# Patient Record
Sex: MALE | Race: WHITE | NOT HISPANIC OR LATINO | Employment: FULL TIME | RURAL
[De-identification: names, ages, dates, MRNs, and addresses within clinical notes are randomized per-mention and may not be internally consistent; named-entity substitution may affect disease eponyms.]

---

## 2018-02-19 ENCOUNTER — HISTORICAL (OUTPATIENT)
Dept: ADMINISTRATIVE | Facility: HOSPITAL | Age: 33
End: 2018-02-19

## 2018-02-21 LAB
LAB AP CLINICAL INFORMATION: NORMAL
LAB AP DIAGNOSIS - HISTORICAL: NORMAL
LAB AP GROSS PATHOLOGY - HISTORICAL: NORMAL
LAB AP SPECIMEN SUBMITTED - HISTORICAL: NORMAL

## 2021-05-17 RX ORDER — RIVAROXABAN 20 MG/1
TABLET, FILM COATED ORAL
COMMUNITY
Start: 2021-05-15 | End: 2021-07-26 | Stop reason: SDUPTHER

## 2021-05-17 RX ORDER — DILTIAZEM HYDROCHLORIDE 240 MG/1
CAPSULE, COATED, EXTENDED RELEASE ORAL
COMMUNITY
Start: 2021-03-20 | End: 2021-05-17 | Stop reason: SDUPTHER

## 2021-05-17 RX ORDER — FUROSEMIDE 40 MG/1
40 TABLET ORAL DAILY
Qty: 90 TABLET | Refills: 3 | Status: SHIPPED | OUTPATIENT
Start: 2021-05-17 | End: 2022-06-03 | Stop reason: SDUPTHER

## 2021-05-17 RX ORDER — POTASSIUM CHLORIDE 1500 MG/1
TABLET, EXTENDED RELEASE ORAL
COMMUNITY
Start: 2021-03-20 | End: 2021-05-17 | Stop reason: SDUPTHER

## 2021-05-17 RX ORDER — METOPROLOL SUCCINATE 100 MG/1
100 TABLET, EXTENDED RELEASE ORAL DAILY
Qty: 90 TABLET | Refills: 3 | Status: SHIPPED | OUTPATIENT
Start: 2021-05-17 | End: 2022-06-03 | Stop reason: SDUPTHER

## 2021-05-17 RX ORDER — FUROSEMIDE 40 MG/1
TABLET ORAL
COMMUNITY
Start: 2021-03-20 | End: 2021-05-17 | Stop reason: SDUPTHER

## 2021-05-17 RX ORDER — POTASSIUM CHLORIDE 1500 MG/1
20 TABLET, EXTENDED RELEASE ORAL DAILY
Qty: 90 TABLET | Refills: 3 | Status: SHIPPED | OUTPATIENT
Start: 2021-05-17 | End: 2021-08-15

## 2021-05-17 RX ORDER — MONTELUKAST SODIUM 10 MG/1
10 TABLET ORAL NIGHTLY
COMMUNITY
End: 2021-05-17 | Stop reason: SDUPTHER

## 2021-05-17 RX ORDER — ATORVASTATIN CALCIUM 20 MG/1
20 TABLET, FILM COATED ORAL DAILY
Qty: 90 TABLET | Refills: 3 | Status: SHIPPED | OUTPATIENT
Start: 2021-05-17 | End: 2022-06-03 | Stop reason: SDUPTHER

## 2021-05-17 RX ORDER — DILTIAZEM HYDROCHLORIDE 240 MG/1
240 CAPSULE, COATED, EXTENDED RELEASE ORAL DAILY
Qty: 90 CAPSULE | Refills: 3 | Status: SHIPPED | OUTPATIENT
Start: 2021-05-17 | End: 2022-06-03 | Stop reason: SDUPTHER

## 2021-05-17 RX ORDER — METOPROLOL SUCCINATE 100 MG/1
TABLET, EXTENDED RELEASE ORAL
COMMUNITY
Start: 2021-03-20 | End: 2021-05-17 | Stop reason: SDUPTHER

## 2021-05-17 RX ORDER — ATORVASTATIN CALCIUM 20 MG/1
TABLET, FILM COATED ORAL
COMMUNITY
Start: 2021-03-20 | End: 2021-05-17 | Stop reason: SDUPTHER

## 2021-05-17 RX ORDER — MONTELUKAST SODIUM 10 MG/1
10 TABLET ORAL DAILY
Qty: 90 TABLET | Refills: 3 | Status: SHIPPED | OUTPATIENT
Start: 2021-05-17 | End: 2021-08-15

## 2021-06-14 RX ORDER — MECLIZINE HYDROCHLORIDE 25 MG/1
25 TABLET ORAL 4 TIMES DAILY PRN
COMMUNITY
End: 2022-10-19 | Stop reason: SDUPTHER

## 2021-06-14 RX ORDER — CETIRIZINE HYDROCHLORIDE 10 MG/1
10 TABLET ORAL DAILY
COMMUNITY

## 2021-06-14 RX ORDER — ACETAMINOPHEN 500 MG
5000 TABLET ORAL DAILY
COMMUNITY

## 2021-07-26 ENCOUNTER — OFFICE VISIT (OUTPATIENT)
Dept: FAMILY MEDICINE | Facility: CLINIC | Age: 36
End: 2021-07-26
Payer: COMMERCIAL

## 2021-07-26 VITALS
SYSTOLIC BLOOD PRESSURE: 134 MMHG | OXYGEN SATURATION: 95 % | HEIGHT: 72 IN | HEART RATE: 61 BPM | DIASTOLIC BLOOD PRESSURE: 74 MMHG | TEMPERATURE: 97 F | WEIGHT: 294 LBS | BODY MASS INDEX: 39.82 KG/M2 | RESPIRATION RATE: 18 BRPM

## 2021-07-26 DIAGNOSIS — E55.9 VITAMIN D DEFICIENCY: ICD-10-CM

## 2021-07-26 DIAGNOSIS — E78.5 HYPERLIPIDEMIA, UNSPECIFIED HYPERLIPIDEMIA TYPE: ICD-10-CM

## 2021-07-26 DIAGNOSIS — Z00.00 ROUTINE GENERAL MEDICAL EXAMINATION AT A HEALTH CARE FACILITY: Primary | ICD-10-CM

## 2021-07-26 DIAGNOSIS — R73.9 HYPERGLYCEMIA: ICD-10-CM

## 2021-07-26 DIAGNOSIS — I48.11 LONGSTANDING PERSISTENT ATRIAL FIBRILLATION: ICD-10-CM

## 2021-07-26 PROCEDURE — 1160F PR REVIEW ALL MEDS BY PRESCRIBER/CLIN PHARMACIST DOCUMENTED: ICD-10-PCS | Mod: ,,, | Performed by: FAMILY MEDICINE

## 2021-07-26 PROCEDURE — 1160F RVW MEDS BY RX/DR IN RCRD: CPT | Mod: ,,, | Performed by: FAMILY MEDICINE

## 2021-07-26 PROCEDURE — 3008F BODY MASS INDEX DOCD: CPT | Mod: ,,, | Performed by: FAMILY MEDICINE

## 2021-07-26 PROCEDURE — 99395 PR PREVENTIVE VISIT,EST,18-39: ICD-10-PCS | Mod: ,,, | Performed by: FAMILY MEDICINE

## 2021-07-26 PROCEDURE — 99395 PREV VISIT EST AGE 18-39: CPT | Mod: ,,, | Performed by: FAMILY MEDICINE

## 2021-07-26 PROCEDURE — 3008F PR BODY MASS INDEX (BMI) DOCUMENTED: ICD-10-PCS | Mod: ,,, | Performed by: FAMILY MEDICINE

## 2021-07-26 PROCEDURE — 1159F PR MEDICATION LIST DOCUMENTED IN MEDICAL RECORD: ICD-10-PCS | Mod: ,,, | Performed by: FAMILY MEDICINE

## 2021-07-26 PROCEDURE — 1159F MED LIST DOCD IN RCRD: CPT | Mod: ,,, | Performed by: FAMILY MEDICINE

## 2021-07-26 RX ORDER — RIVAROXABAN 20 MG/1
20 TABLET, FILM COATED ORAL DAILY
Qty: 90 TABLET | Refills: 3 | Status: SHIPPED | OUTPATIENT
Start: 2021-07-26 | End: 2022-06-03 | Stop reason: SDUPTHER

## 2021-07-27 PROBLEM — I48.11 LONGSTANDING PERSISTENT ATRIAL FIBRILLATION: Status: ACTIVE | Noted: 2021-07-27

## 2021-08-18 ENCOUNTER — OFFICE VISIT (OUTPATIENT)
Dept: FAMILY MEDICINE | Facility: CLINIC | Age: 36
End: 2021-08-18
Payer: COMMERCIAL

## 2021-08-18 VITALS
DIASTOLIC BLOOD PRESSURE: 76 MMHG | SYSTOLIC BLOOD PRESSURE: 118 MMHG | OXYGEN SATURATION: 96 % | RESPIRATION RATE: 18 BRPM | TEMPERATURE: 99 F | HEART RATE: 97 BPM

## 2021-08-18 DIAGNOSIS — Z20.822 COUGH WITH EXPOSURE TO SEVERE ACUTE RESPIRATORY SYNDROME CORONAVIRUS 2 (SARS-COV-2): Primary | ICD-10-CM

## 2021-08-18 DIAGNOSIS — R05.8 COUGH WITH EXPOSURE TO SEVERE ACUTE RESPIRATORY SYNDROME CORONAVIRUS 2 (SARS-COV-2): Primary | ICD-10-CM

## 2021-08-18 LAB
CTP QC/QA: YES
FLUAV AG NPH QL: NEGATIVE
FLUBV AG NPH QL: NEGATIVE
SARS-COV-2 AG RESP QL IA.RAPID: NEGATIVE

## 2021-08-18 PROCEDURE — 1159F PR MEDICATION LIST DOCUMENTED IN MEDICAL RECORD: ICD-10-PCS | Mod: ,,, | Performed by: NURSE PRACTITIONER

## 2021-08-18 PROCEDURE — 87428 POCT SARS-COV2 (COVID) WITH FLU ANTIGEN: ICD-10-PCS | Mod: QW,,, | Performed by: NURSE PRACTITIONER

## 2021-08-18 PROCEDURE — 99214 PR OFFICE/OUTPT VISIT, EST, LEVL IV, 30-39 MIN: ICD-10-PCS | Mod: ,,, | Performed by: NURSE PRACTITIONER

## 2021-08-18 PROCEDURE — 3078F DIAST BP <80 MM HG: CPT | Mod: ,,, | Performed by: NURSE PRACTITIONER

## 2021-08-18 PROCEDURE — 3044F HG A1C LEVEL LT 7.0%: CPT | Mod: ,,, | Performed by: NURSE PRACTITIONER

## 2021-08-18 PROCEDURE — 1159F MED LIST DOCD IN RCRD: CPT | Mod: ,,, | Performed by: NURSE PRACTITIONER

## 2021-08-18 PROCEDURE — 3074F PR MOST RECENT SYSTOLIC BLOOD PRESSURE < 130 MM HG: ICD-10-PCS | Mod: ,,, | Performed by: NURSE PRACTITIONER

## 2021-08-18 PROCEDURE — 3044F PR MOST RECENT HEMOGLOBIN A1C LEVEL <7.0%: ICD-10-PCS | Mod: ,,, | Performed by: NURSE PRACTITIONER

## 2021-08-18 PROCEDURE — 1160F RVW MEDS BY RX/DR IN RCRD: CPT | Mod: ,,, | Performed by: NURSE PRACTITIONER

## 2021-08-18 PROCEDURE — 3074F SYST BP LT 130 MM HG: CPT | Mod: ,,, | Performed by: NURSE PRACTITIONER

## 2021-08-18 PROCEDURE — 1160F PR REVIEW ALL MEDS BY PRESCRIBER/CLIN PHARMACIST DOCUMENTED: ICD-10-PCS | Mod: ,,, | Performed by: NURSE PRACTITIONER

## 2021-08-18 PROCEDURE — 3078F PR MOST RECENT DIASTOLIC BLOOD PRESSURE < 80 MM HG: ICD-10-PCS | Mod: ,,, | Performed by: NURSE PRACTITIONER

## 2021-08-18 PROCEDURE — 87428 SARSCOV & INF VIR A&B AG IA: CPT | Mod: QW,,, | Performed by: NURSE PRACTITIONER

## 2021-08-18 PROCEDURE — 99214 OFFICE O/P EST MOD 30 MIN: CPT | Mod: ,,, | Performed by: NURSE PRACTITIONER

## 2021-08-18 RX ORDER — POTASSIUM CHLORIDE 20 MEQ/1
20 TABLET, EXTENDED RELEASE ORAL 2 TIMES DAILY
COMMUNITY
End: 2022-06-03 | Stop reason: SDUPTHER

## 2021-08-18 RX ORDER — MONTELUKAST SODIUM 10 MG/1
10 TABLET ORAL NIGHTLY
COMMUNITY
End: 2022-06-03 | Stop reason: SDUPTHER

## 2021-08-20 ENCOUNTER — CLINICAL SUPPORT (OUTPATIENT)
Dept: FAMILY MEDICINE | Facility: CLINIC | Age: 36
End: 2021-08-20
Payer: COMMERCIAL

## 2021-08-20 DIAGNOSIS — Z11.52 ENCOUNTER FOR SCREENING LABORATORY TESTING FOR COVID-19 VIRUS: Primary | ICD-10-CM

## 2021-08-23 PROCEDURE — U0005 INFEC AGEN DETEC AMPLI PROBE: HCPCS | Mod: ,,, | Performed by: CLINICAL MEDICAL LABORATORY

## 2021-08-23 PROCEDURE — U0003 INFECTIOUS AGENT DETECTION BY NUCLEIC ACID (DNA OR RNA); SEVERE ACUTE RESPIRATORY SYNDROME CORONAVIRUS 2 (SARS-COV-2) (CORONAVIRUS DISEASE [COVID-19]), AMPLIFIED PROBE TECHNIQUE, MAKING USE OF HIGH THROUGHPUT TECHNOLOGIES AS DESCRIBED BY CMS-2020-01-R: HCPCS | Mod: 90,,, | Performed by: CLINICAL MEDICAL LABORATORY

## 2021-08-23 PROCEDURE — U0003 MAYO GENERIC ORDERABLE: ICD-10-PCS | Mod: 90,,, | Performed by: CLINICAL MEDICAL LABORATORY

## 2021-08-23 PROCEDURE — U0005 PR INFECT AGENT DETECTION, SARS-COV-2 COVID-19, AMPLIF PROBE: ICD-10-PCS | Mod: ,,, | Performed by: CLINICAL MEDICAL LABORATORY

## 2021-08-24 ENCOUNTER — CLINICAL SUPPORT (OUTPATIENT)
Dept: FAMILY MEDICINE | Facility: CLINIC | Age: 36
End: 2021-08-24
Payer: COMMERCIAL

## 2021-08-24 DIAGNOSIS — Z20.822 COUGH WITH EXPOSURE TO SEVERE ACUTE RESPIRATORY SYNDROME CORONAVIRUS 2 (SARS-COV-2): Primary | ICD-10-CM

## 2021-08-24 DIAGNOSIS — J32.9 SINUSITIS, UNSPECIFIED CHRONICITY, UNSPECIFIED LOCATION: ICD-10-CM

## 2021-08-24 DIAGNOSIS — R05.8 COUGH WITH EXPOSURE TO SEVERE ACUTE RESPIRATORY SYNDROME CORONAVIRUS 2 (SARS-COV-2): Primary | ICD-10-CM

## 2021-08-24 PROCEDURE — 87428 SARSCOV & INF VIR A&B AG IA: CPT | Mod: QW,,, | Performed by: NURSE PRACTITIONER

## 2021-08-24 PROCEDURE — 87428 POCT SARS-COV2 (COVID) WITH FLU ANTIGEN: ICD-10-PCS | Mod: QW,,, | Performed by: NURSE PRACTITIONER

## 2021-08-24 RX ORDER — METHYLPREDNISOLONE 4 MG/1
TABLET ORAL
Qty: 1 PACKAGE | Refills: 0 | Status: SHIPPED | OUTPATIENT
Start: 2021-08-24 | End: 2022-01-26 | Stop reason: ALTCHOICE

## 2021-08-24 RX ORDER — AZITHROMYCIN 250 MG/1
TABLET, FILM COATED ORAL
Qty: 5 TABLET | Refills: 0 | Status: SHIPPED | OUTPATIENT
Start: 2021-08-24 | End: 2022-01-26 | Stop reason: ALTCHOICE

## 2021-08-24 RX ORDER — METHYLPREDNISOLONE 4 MG/1
4 TABLET ORAL
COMMUNITY
End: 2021-08-24 | Stop reason: SDUPTHER

## 2021-08-24 RX ORDER — AZITHROMYCIN 250 MG/1
500 TABLET, FILM COATED ORAL DAILY
COMMUNITY
End: 2021-08-24 | Stop reason: SDUPTHER

## 2021-08-25 LAB — MAYO GENERIC ORDERABLE RESULT: NORMAL

## 2021-10-01 ENCOUNTER — CLINICAL SUPPORT (OUTPATIENT)
Dept: FAMILY MEDICINE | Facility: CLINIC | Age: 36
End: 2021-10-01
Payer: COMMERCIAL

## 2021-10-01 DIAGNOSIS — Z23 NEED FOR IMMUNIZATION AGAINST INFLUENZA: Primary | ICD-10-CM

## 2021-10-01 PROCEDURE — 90471 IMMUNIZATION ADMIN: CPT | Mod: ,,, | Performed by: NURSE PRACTITIONER

## 2021-10-01 PROCEDURE — 90686 FLU VACCINE (QUAD) GREATER THAN OR EQUAL TO 3YO PRESERVATIVE FREE IM: ICD-10-PCS | Mod: ,,, | Performed by: NURSE PRACTITIONER

## 2021-10-01 PROCEDURE — 90471 FLU VACCINE (QUAD) GREATER THAN OR EQUAL TO 3YO PRESERVATIVE FREE IM: ICD-10-PCS | Mod: ,,, | Performed by: NURSE PRACTITIONER

## 2021-10-01 PROCEDURE — 90686 IIV4 VACC NO PRSV 0.5 ML IM: CPT | Mod: ,,, | Performed by: NURSE PRACTITIONER

## 2022-01-17 ENCOUNTER — LAB VISIT (OUTPATIENT)
Dept: LAB | Facility: CLINIC | Age: 37
End: 2022-01-17
Payer: COMMERCIAL

## 2022-01-17 DIAGNOSIS — E78.5 HYPERLIPIDEMIA, UNSPECIFIED HYPERLIPIDEMIA TYPE: ICD-10-CM

## 2022-01-17 DIAGNOSIS — R73.9 HYPERGLYCEMIA: ICD-10-CM

## 2022-01-17 DIAGNOSIS — E55.9 VITAMIN D DEFICIENCY: ICD-10-CM

## 2022-01-17 DIAGNOSIS — Z13.220 SCREENING FOR LIPOID DISORDERS: ICD-10-CM

## 2022-01-17 DIAGNOSIS — I48.11 LONGSTANDING PERSISTENT ATRIAL FIBRILLATION: ICD-10-CM

## 2022-01-17 DIAGNOSIS — Z13.1 SCREENING FOR DIABETES MELLITUS: Primary | ICD-10-CM

## 2022-01-17 LAB
25(OH)D3 SERPL-MCNC: 35.8 NG/ML
ALBUMIN SERPL BCP-MCNC: 3.8 G/DL (ref 3.5–5)
ALBUMIN/GLOB SERPL: 0.9 {RATIO}
ALP SERPL-CCNC: 90 U/L (ref 45–115)
ALT SERPL W P-5'-P-CCNC: 33 U/L (ref 16–61)
ANION GAP SERPL CALCULATED.3IONS-SCNC: 7 MMOL/L (ref 7–16)
AST SERPL W P-5'-P-CCNC: 22 U/L (ref 15–37)
BASOPHILS # BLD AUTO: 0.06 K/UL (ref 0–0.2)
BASOPHILS NFR BLD AUTO: 0.8 % (ref 0–1)
BILIRUB SERPL-MCNC: 2 MG/DL (ref 0–1.2)
BUN SERPL-MCNC: 7 MG/DL (ref 7–18)
BUN/CREAT SERPL: 9 (ref 6–20)
CALCIUM SERPL-MCNC: 8.9 MG/DL (ref 8.5–10.1)
CHLORIDE SERPL-SCNC: 104 MMOL/L (ref 98–107)
CHOLEST SERPL-MCNC: 121 MG/DL (ref 0–200)
CHOLEST/HDLC SERPL: 3.8 {RATIO}
CO2 SERPL-SCNC: 32 MMOL/L (ref 21–32)
CREAT SERPL-MCNC: 0.76 MG/DL (ref 0.7–1.3)
DIFFERENTIAL METHOD BLD: ABNORMAL
EOSINOPHIL # BLD AUTO: 0.2 K/UL (ref 0–0.5)
EOSINOPHIL NFR BLD AUTO: 2.7 % (ref 1–4)
ERYTHROCYTE [DISTWIDTH] IN BLOOD BY AUTOMATED COUNT: 13.2 % (ref 11.5–14.5)
EST. AVERAGE GLUCOSE BLD GHB EST-MCNC: 117 MG/DL
GLOBULIN SER-MCNC: 4.1 G/DL (ref 2–4)
GLUCOSE SERPL-MCNC: 109 MG/DL (ref 74–106)
HBA1C MFR BLD HPLC: 6.1 % (ref 4.5–6.6)
HCT VFR BLD AUTO: 49.6 % (ref 40–54)
HDLC SERPL-MCNC: 32 MG/DL (ref 40–60)
HGB BLD-MCNC: 15.8 G/DL (ref 13.5–18)
IMM GRANULOCYTES # BLD AUTO: 0.02 K/UL (ref 0–0.04)
IMM GRANULOCYTES NFR BLD: 0.3 % (ref 0–0.4)
LDLC SERPL CALC-MCNC: 77 MG/DL
LDLC/HDLC SERPL: 2.4 {RATIO}
LYMPHOCYTES # BLD AUTO: 1.73 K/UL (ref 1–4.8)
LYMPHOCYTES NFR BLD AUTO: 22.9 % (ref 27–41)
MCH RBC QN AUTO: 27.5 PG (ref 27–31)
MCHC RBC AUTO-ENTMCNC: 31.9 G/DL (ref 32–36)
MCV RBC AUTO: 86.3 FL (ref 80–96)
MONOCYTES # BLD AUTO: 0.71 K/UL (ref 0–0.8)
MONOCYTES NFR BLD AUTO: 9.4 % (ref 2–6)
MPC BLD CALC-MCNC: 11.2 FL (ref 9.4–12.4)
NEUTROPHILS # BLD AUTO: 4.82 K/UL (ref 1.8–7.7)
NEUTROPHILS NFR BLD AUTO: 63.9 % (ref 53–65)
NONHDLC SERPL-MCNC: 89 MG/DL
NRBC # BLD AUTO: 0 X10E3/UL
NRBC, AUTO (.00): 0 %
PLATELET # BLD AUTO: 317 K/UL (ref 150–400)
POTASSIUM SERPL-SCNC: 3.9 MMOL/L (ref 3.5–5.1)
PROT SERPL-MCNC: 7.9 G/DL (ref 6.4–8.2)
RBC # BLD AUTO: 5.75 M/UL (ref 4.6–6.2)
SODIUM SERPL-SCNC: 139 MMOL/L (ref 136–145)
TRIGL SERPL-MCNC: 59 MG/DL (ref 35–150)
TSH SERPL DL<=0.005 MIU/L-ACNC: 3.09 UIU/ML (ref 0.36–3.74)
VLDLC SERPL-MCNC: 12 MG/DL
WBC # BLD AUTO: 7.54 K/UL (ref 4.5–11)

## 2022-01-17 PROCEDURE — 83036 HEMOGLOBIN A1C: ICD-10-PCS | Mod: ICN,,, | Performed by: CLINICAL MEDICAL LABORATORY

## 2022-01-17 PROCEDURE — 82306 VITAMIN D: ICD-10-PCS | Mod: ICN,,, | Performed by: CLINICAL MEDICAL LABORATORY

## 2022-01-17 PROCEDURE — 83036 HEMOGLOBIN GLYCOSYLATED A1C: CPT | Mod: ICN,,, | Performed by: CLINICAL MEDICAL LABORATORY

## 2022-01-17 PROCEDURE — 80061 LIPID PANEL: ICD-10-PCS | Mod: ICN,,, | Performed by: CLINICAL MEDICAL LABORATORY

## 2022-01-17 PROCEDURE — 82306 VITAMIN D 25 HYDROXY: CPT | Mod: ICN,,, | Performed by: CLINICAL MEDICAL LABORATORY

## 2022-01-17 PROCEDURE — 80050 CBC WITH DIFFERENTIAL: ICD-10-PCS | Mod: ICN,,, | Performed by: CLINICAL MEDICAL LABORATORY

## 2022-01-17 PROCEDURE — 80061 LIPID PANEL: CPT | Mod: ICN,,, | Performed by: CLINICAL MEDICAL LABORATORY

## 2022-01-17 PROCEDURE — 80050 GENERAL HEALTH PANEL: CPT | Mod: ICN,,, | Performed by: CLINICAL MEDICAL LABORATORY

## 2022-01-26 ENCOUNTER — OFFICE VISIT (OUTPATIENT)
Dept: FAMILY MEDICINE | Facility: CLINIC | Age: 37
End: 2022-01-26
Payer: COMMERCIAL

## 2022-01-26 VITALS
RESPIRATION RATE: 18 BRPM | TEMPERATURE: 97 F | BODY MASS INDEX: 40.23 KG/M2 | HEIGHT: 72 IN | SYSTOLIC BLOOD PRESSURE: 118 MMHG | OXYGEN SATURATION: 98 % | DIASTOLIC BLOOD PRESSURE: 65 MMHG | HEART RATE: 85 BPM | WEIGHT: 297 LBS

## 2022-01-26 DIAGNOSIS — R73.03 PRE-DIABETES: Chronic | ICD-10-CM

## 2022-01-26 DIAGNOSIS — E55.9 VITAMIN D DEFICIENCY: ICD-10-CM

## 2022-01-26 DIAGNOSIS — I48.11 LONGSTANDING PERSISTENT ATRIAL FIBRILLATION: ICD-10-CM

## 2022-01-26 DIAGNOSIS — Z00.00 ROUTINE GENERAL MEDICAL EXAMINATION AT A HEALTH CARE FACILITY: Primary | ICD-10-CM

## 2022-01-26 DIAGNOSIS — E78.5 HYPERLIPIDEMIA, UNSPECIFIED HYPERLIPIDEMIA TYPE: ICD-10-CM

## 2022-01-26 PROCEDURE — 3074F PR MOST RECENT SYSTOLIC BLOOD PRESSURE < 130 MM HG: ICD-10-PCS | Mod: ,,, | Performed by: FAMILY MEDICINE

## 2022-01-26 PROCEDURE — 1160F RVW MEDS BY RX/DR IN RCRD: CPT | Mod: ,,, | Performed by: FAMILY MEDICINE

## 2022-01-26 PROCEDURE — 3078F PR MOST RECENT DIASTOLIC BLOOD PRESSURE < 80 MM HG: ICD-10-PCS | Mod: ,,, | Performed by: FAMILY MEDICINE

## 2022-01-26 PROCEDURE — 3044F HG A1C LEVEL LT 7.0%: CPT | Mod: ,,, | Performed by: FAMILY MEDICINE

## 2022-01-26 PROCEDURE — 1159F PR MEDICATION LIST DOCUMENTED IN MEDICAL RECORD: ICD-10-PCS | Mod: ,,, | Performed by: FAMILY MEDICINE

## 2022-01-26 PROCEDURE — 3008F BODY MASS INDEX DOCD: CPT | Mod: ,,, | Performed by: FAMILY MEDICINE

## 2022-01-26 PROCEDURE — 3008F PR BODY MASS INDEX (BMI) DOCUMENTED: ICD-10-PCS | Mod: ,,, | Performed by: FAMILY MEDICINE

## 2022-01-26 PROCEDURE — 99395 PREV VISIT EST AGE 18-39: CPT | Mod: ,,, | Performed by: FAMILY MEDICINE

## 2022-01-26 PROCEDURE — 3074F SYST BP LT 130 MM HG: CPT | Mod: ,,, | Performed by: FAMILY MEDICINE

## 2022-01-26 PROCEDURE — 1159F MED LIST DOCD IN RCRD: CPT | Mod: ,,, | Performed by: FAMILY MEDICINE

## 2022-01-26 PROCEDURE — 1160F PR REVIEW ALL MEDS BY PRESCRIBER/CLIN PHARMACIST DOCUMENTED: ICD-10-PCS | Mod: ,,, | Performed by: FAMILY MEDICINE

## 2022-01-26 PROCEDURE — 3044F PR MOST RECENT HEMOGLOBIN A1C LEVEL <7.0%: ICD-10-PCS | Mod: ,,, | Performed by: FAMILY MEDICINE

## 2022-01-26 PROCEDURE — 99395 PR PREVENTIVE VISIT,EST,18-39: ICD-10-PCS | Mod: ,,, | Performed by: FAMILY MEDICINE

## 2022-01-26 PROCEDURE — 3078F DIAST BP <80 MM HG: CPT | Mod: ,,, | Performed by: FAMILY MEDICINE

## 2022-01-26 NOTE — PROGRESS NOTES
Subjective:       Patient ID: Eliecer Collins is a 37 y.o. male.    Chief Complaint: wellness (Did labs on 1/17/22)    36 yo WM here for check up / HY visit and lab done on 1/17.     Review of Systems   Constitutional: Negative for activity change.   Eyes: Negative for visual disturbance.   Respiratory: Negative for shortness of breath.    Cardiovascular: Negative for chest pain.   Gastrointestinal: Negative for abdominal pain.   Neurological: Negative for headaches.   Psychiatric/Behavioral: Negative for dysphoric mood.         Lab Visit on 01/17/2022   Component Date Value Ref Range Status    Hemoglobin A1C 01/17/2022 6.1  4.5 - 6.6 % Final      Normal:               <5.7%  Pre-Diabetic:       5.7% to 6.4%  Diabetic:             >6.4%  Diabetic Goal:     <7%    Estimated Average Glucose 01/17/2022 117  mg/dL Final    Triglycerides 01/17/2022 59  35 - 150 mg/dL Final      Normal:  <150 mg/dL  Borderline High: 150-199 mg/dL  High:   200-499 mg/dL  Very High:  >=500    Cholesterol 01/17/2022 121  0 - 200 mg/dL Final      <200 mg/dL:  Desirable  200-240 mg/dL: Borderline High  >240 mg/dL:  High    HDL Cholesterol 01/17/2022 32* 40 - 60 mg/dL Final      <40 mg/dL: Low HDL  40-60 mg/dL: Normal  >60 mg/dL: Desirable    Cholesterol/HDL Ratio (Risk Factor) 01/17/2022 3.8   Final    Non-HDL 01/17/2022 89  mg/dL Final    LDL Calculated 01/17/2022 77  mg/dL Final    Unable to calculate due to one of the following values:  Cholesterol <5  HDL Cholesterol <5  Triglycerides <10 or >400    LDL/HDL 01/17/2022 2.4   Final    Unable to calculate due to one of the following values:  Cholesterol <5  HDL Cholesterol <5  Triglycerides <10 or >400    VLDL 01/17/2022 12  mg/dL Final    Sodium 01/17/2022 139  136 - 145 mmol/L Final    Potassium 01/17/2022 3.9  3.5 - 5.1 mmol/L Final    Chloride 01/17/2022 104  98 - 107 mmol/L Final    CO2 01/17/2022 32  21 - 32 mmol/L Final    Anion Gap 01/17/2022 7  7 - 16 mmol/L Final     Glucose 01/17/2022 109* 74 - 106 mg/dL Final    BUN 01/17/2022 7  7 - 18 mg/dL Final    Creatinine 01/17/2022 0.76  0.70 - 1.30 mg/dL Final    BUN/Creatinine Ratio 01/17/2022 9  6 - 20 Final    Calcium 01/17/2022 8.9  8.5 - 10.1 mg/dL Final    Total Protein 01/17/2022 7.9  6.4 - 8.2 g/dL Final    Albumin 01/17/2022 3.8  3.5 - 5.0 g/dL Final    Globulin 01/17/2022 4.1* 2.0 - 4.0 g/dL Final    A/G Ratio 01/17/2022 0.9   Final    Bilirubin, Total 01/17/2022 2.0* 0.0 - 1.2 mg/dL Final    Alk Phos 01/17/2022 90  45 - 115 U/L Final    ALT 01/17/2022 33  16 - 61 U/L Final    AST 01/17/2022 22  15 - 37 U/L Final    eGFR 01/17/2022 123  >=60 mL/min/1.73m² Final    TSH 01/17/2022 3.090  0.358 - 3.740 uIU/mL Final    Vitamin D 25-Hydroxy, Blood 01/17/2022 35.8  ng/mL Final    Vitamin D 25-OH Adult Reference Values:  Deficiency: <20 ng/mL  Insufficiency: 20 - <30 ng/mL  Sufficiency: 30 -100 ng/mL    Vitamin D 25-OH Pediatric Reference Values:  Deficiency: <15 ng/mL  Insufficiency: 15 - <20 ng/mL  Sufficiency: 20 - 100 ng/mL    WBC 01/17/2022 7.54  4.50 - 11.00 K/uL Final    RBC 01/17/2022 5.75  4.60 - 6.20 M/uL Final    Hemoglobin 01/17/2022 15.8  13.5 - 18.0 g/dL Final    Hematocrit 01/17/2022 49.6  40.0 - 54.0 % Final    MCV 01/17/2022 86.3  80.0 - 96.0 fL Final    MCH 01/17/2022 27.5  27.0 - 31.0 pg Final    MCHC 01/17/2022 31.9* 32.0 - 36.0 g/dL Final    RDW 01/17/2022 13.2  11.5 - 14.5 % Final    Platelet Count 01/17/2022 317  150 - 400 K/uL Final    MPV 01/17/2022 11.2  9.4 - 12.4 fL Final    Neutrophils % 01/17/2022 63.9  53.0 - 65.0 % Final    Lymphocytes % 01/17/2022 22.9* 27.0 - 41.0 % Final    Monocytes % 01/17/2022 9.4* 2.0 - 6.0 % Final    Eosinophils % 01/17/2022 2.7  1.0 - 4.0 % Final    Basophils % 01/17/2022 0.8  0.0 - 1.0 % Final    Immature Granulocytes % 01/17/2022 0.3  0.0 - 0.4 % Final    nRBC, Auto 01/17/2022 0.0  <=0.0 % Final    Neutrophils, Abs 01/17/2022 4.82  1.80 -  7.70 K/uL Final    Lymphocytes, Absolute 01/17/2022 1.73  1.00 - 4.80 K/uL Final    Monocytes, Absolute 01/17/2022 0.71  0.00 - 0.80 K/uL Final    Eosinophils, Absolute 01/17/2022 0.20  0.00 - 0.50 K/uL Final    Basophils, Absolute 01/17/2022 0.06  0.00 - 0.20 K/uL Final    Immature Granulocytes, Absolute 01/17/2022 0.02  0.00 - 0.04 K/uL Final    nRBC, Absolute 01/17/2022 0.00  <=0.00 x10e3/uL Final    Diff Type 01/17/2022 Auto   Final       Objective:     Blood pressure 118/65, pulse 85, temperature 97.1 °F (36.2 °C), temperature source Skin, resp. rate 18, height 6' (1.829 m), weight 134.7 kg (297 lb), SpO2 98 %.      Physical Exam  Constitutional:       Appearance: Normal appearance.   HENT:      Head: Normocephalic and atraumatic.      Nose: Nose normal.      Mouth/Throat:      Mouth: Mucous membranes are moist.   Eyes:      Pupils: Pupils are equal, round, and reactive to light.   Cardiovascular:      Rate and Rhythm: Normal rate. Rhythm irregular.      Pulses: Normal pulses.   Pulmonary:      Effort: Pulmonary effort is normal.      Breath sounds: Normal breath sounds.   Abdominal:      General: Abdomen is flat.   Skin:     General: Skin is warm and dry.   Neurological:      General: No focal deficit present.      Mental Status: He is alert and oriented to person, place, and time.   Psychiatric:         Mood and Affect: Mood normal.         Thought Content: Thought content normal.         Judgment: Judgment normal.         Assessment:       Problem List Items Addressed This Visit        Cardiac/Vascular    Longstanding persistent atrial fibrillation    Relevant Orders    Lipid Panel    Comprehensive Metabolic Panel    Magnesium       Endocrine    Pre-diabetes (Chronic)    Relevant Orders    Hemoglobin A1C      Other Visit Diagnoses     Routine general medical examination at a health care facility    -  Primary    Hyperlipidemia, unspecified hyperlipidemia type        Relevant Orders    Hemoglobin A1C     Lipid Panel    Comprehensive Metabolic Panel    Vitamin D deficiency        Relevant Orders    Vitamin D          Plan:       RTC 6 months for second HY of the year. Lab prior, but can't use wellness codes again on the lab. Overall health goal: Improve healthy choices and lose weight to normal BMI.     Health goals: Take medications as prescribed, eat well balance meals and avoid all tobacco products.     Everyone should plan to exercise / have physical activity increased for at least 30 minutes 5 days a week.

## 2022-06-03 DIAGNOSIS — J30.89 NON-SEASONAL ALLERGIC RHINITIS, UNSPECIFIED TRIGGER: ICD-10-CM

## 2022-06-03 DIAGNOSIS — I48.11 LONGSTANDING PERSISTENT ATRIAL FIBRILLATION: ICD-10-CM

## 2022-06-03 DIAGNOSIS — E78.5 HYPERLIPIDEMIA, UNSPECIFIED HYPERLIPIDEMIA TYPE: Primary | ICD-10-CM

## 2022-06-03 DIAGNOSIS — E87.6 HYPOKALEMIA: ICD-10-CM

## 2022-06-03 RX ORDER — MONTELUKAST SODIUM 10 MG/1
10 TABLET ORAL NIGHTLY
Qty: 90 TABLET | Refills: 3 | Status: SHIPPED | OUTPATIENT
Start: 2022-06-03 | End: 2023-05-25 | Stop reason: SDUPTHER

## 2022-06-03 RX ORDER — METOPROLOL SUCCINATE 100 MG/1
100 TABLET, EXTENDED RELEASE ORAL DAILY
Qty: 90 TABLET | Refills: 3 | Status: SHIPPED | OUTPATIENT
Start: 2022-06-03 | End: 2023-05-25 | Stop reason: SDUPTHER

## 2022-06-03 RX ORDER — ATORVASTATIN CALCIUM 20 MG/1
20 TABLET, FILM COATED ORAL DAILY
Qty: 90 TABLET | Refills: 3 | Status: SHIPPED | OUTPATIENT
Start: 2022-06-03 | End: 2023-05-25 | Stop reason: SDUPTHER

## 2022-06-03 RX ORDER — FUROSEMIDE 40 MG/1
40 TABLET ORAL DAILY
Qty: 90 TABLET | Refills: 3 | Status: SHIPPED | OUTPATIENT
Start: 2022-06-03 | End: 2023-05-25 | Stop reason: SDUPTHER

## 2022-06-03 RX ORDER — DILTIAZEM HYDROCHLORIDE 240 MG/1
240 CAPSULE, COATED, EXTENDED RELEASE ORAL DAILY
Qty: 90 CAPSULE | Refills: 3 | Status: SHIPPED | OUTPATIENT
Start: 2022-06-03 | End: 2023-05-25 | Stop reason: SDUPTHER

## 2022-06-03 RX ORDER — RIVAROXABAN 20 MG/1
20 TABLET, FILM COATED ORAL DAILY
Qty: 90 TABLET | Refills: 3 | Status: SHIPPED | OUTPATIENT
Start: 2022-06-03 | End: 2023-05-25 | Stop reason: SDUPTHER

## 2022-06-03 RX ORDER — POTASSIUM CHLORIDE 20 MEQ/1
20 TABLET, EXTENDED RELEASE ORAL DAILY
Qty: 90 TABLET | Refills: 3 | Status: SHIPPED | OUTPATIENT
Start: 2022-06-03 | End: 2023-05-25 | Stop reason: SDUPTHER

## 2022-07-25 ENCOUNTER — OFFICE VISIT (OUTPATIENT)
Dept: FAMILY MEDICINE | Facility: CLINIC | Age: 37
End: 2022-07-25
Payer: COMMERCIAL

## 2022-07-25 VITALS
OXYGEN SATURATION: 95 % | BODY MASS INDEX: 39.96 KG/M2 | SYSTOLIC BLOOD PRESSURE: 110 MMHG | HEIGHT: 72 IN | HEART RATE: 83 BPM | DIASTOLIC BLOOD PRESSURE: 70 MMHG | WEIGHT: 295 LBS | TEMPERATURE: 98 F | RESPIRATION RATE: 18 BRPM

## 2022-07-25 DIAGNOSIS — Z00.00 ROUTINE GENERAL MEDICAL EXAMINATION AT A HEALTH CARE FACILITY: Primary | ICD-10-CM

## 2022-07-25 DIAGNOSIS — E78.5 HYPERLIPIDEMIA, UNSPECIFIED HYPERLIPIDEMIA TYPE: ICD-10-CM

## 2022-07-25 DIAGNOSIS — Z13.1 SCREENING FOR DIABETES MELLITUS: ICD-10-CM

## 2022-07-25 DIAGNOSIS — Z13.220 SCREENING, LIPID: ICD-10-CM

## 2022-07-25 DIAGNOSIS — E55.9 VITAMIN D DEFICIENCY: ICD-10-CM

## 2022-07-25 DIAGNOSIS — R73.03 PRE-DIABETES: ICD-10-CM

## 2022-07-25 PROCEDURE — 1159F PR MEDICATION LIST DOCUMENTED IN MEDICAL RECORD: ICD-10-PCS | Mod: ,,, | Performed by: FAMILY MEDICINE

## 2022-07-25 PROCEDURE — 1160F RVW MEDS BY RX/DR IN RCRD: CPT | Mod: ,,, | Performed by: FAMILY MEDICINE

## 2022-07-25 PROCEDURE — 3074F PR MOST RECENT SYSTOLIC BLOOD PRESSURE < 130 MM HG: ICD-10-PCS | Mod: ,,, | Performed by: FAMILY MEDICINE

## 2022-07-25 PROCEDURE — 3078F PR MOST RECENT DIASTOLIC BLOOD PRESSURE < 80 MM HG: ICD-10-PCS | Mod: ,,, | Performed by: FAMILY MEDICINE

## 2022-07-25 PROCEDURE — 3008F PR BODY MASS INDEX (BMI) DOCUMENTED: ICD-10-PCS | Mod: ,,, | Performed by: FAMILY MEDICINE

## 2022-07-25 PROCEDURE — 3044F HG A1C LEVEL LT 7.0%: CPT | Mod: ,,, | Performed by: FAMILY MEDICINE

## 2022-07-25 PROCEDURE — 3074F SYST BP LT 130 MM HG: CPT | Mod: ,,, | Performed by: FAMILY MEDICINE

## 2022-07-25 PROCEDURE — 3044F PR MOST RECENT HEMOGLOBIN A1C LEVEL <7.0%: ICD-10-PCS | Mod: ,,, | Performed by: FAMILY MEDICINE

## 2022-07-25 PROCEDURE — 99395 PREV VISIT EST AGE 18-39: CPT | Mod: ,,, | Performed by: FAMILY MEDICINE

## 2022-07-25 PROCEDURE — 99395 PR PREVENTIVE VISIT,EST,18-39: ICD-10-PCS | Mod: ,,, | Performed by: FAMILY MEDICINE

## 2022-07-25 PROCEDURE — 3008F BODY MASS INDEX DOCD: CPT | Mod: ,,, | Performed by: FAMILY MEDICINE

## 2022-07-25 PROCEDURE — 3078F DIAST BP <80 MM HG: CPT | Mod: ,,, | Performed by: FAMILY MEDICINE

## 2022-07-25 PROCEDURE — 1159F MED LIST DOCD IN RCRD: CPT | Mod: ,,, | Performed by: FAMILY MEDICINE

## 2022-07-25 PROCEDURE — 1160F PR REVIEW ALL MEDS BY PRESCRIBER/CLIN PHARMACIST DOCUMENTED: ICD-10-PCS | Mod: ,,, | Performed by: FAMILY MEDICINE

## 2022-07-25 NOTE — PROGRESS NOTES
Subjective:       Patient ID: Eliecer Collins is a 37 y.o. male.    Chief Complaint: Healthy You Vist    36 yo WM here for HY visit and lab. Doing well. Has been to [facility] recently for check up.     Review of Systems   Constitutional: Negative for activity change.   Eyes: Negative for visual disturbance.   Respiratory: Negative for shortness of breath.    Cardiovascular: Negative for chest pain.   Gastrointestinal: Negative for abdominal pain.   Neurological: Negative for headaches.   Psychiatric/Behavioral: Negative for dysphoric mood.         No visits with results within 1 Week(s) from this visit.   Latest known visit with results is:   Lab Visit on 07/08/2022   Component Date Value Ref Range Status    Hemoglobin A1C 07/08/2022 6.1  4.5 - 6.6 % Final      Normal:               <5.7%  Pre-Diabetic:       5.7% to 6.4%  Diabetic:             >6.4%  Diabetic Goal:     <7%    Estimated Average Glucose 07/08/2022 117  mg/dL Final    Triglycerides 07/08/2022 77  35 - 150 mg/dL Final      Normal:  <150 mg/dL  Borderline High: 150-199 mg/dL  High:   200-499 mg/dL  Very High:  >=500    Cholesterol 07/08/2022 135  0 - 200 mg/dL Final      <200 mg/dL:  Desirable  200-240 mg/dL: Borderline High  >240 mg/dL:  High    HDL Cholesterol 07/08/2022 33 (A) 40 - 60 mg/dL Final      <40 mg/dL: Low HDL  40-60 mg/dL: Normal  >60 mg/dL: Desirable    Cholesterol/HDL Ratio (Risk Factor) 07/08/2022 4.1   Final    Non-HDL 07/08/2022 102  mg/dL Final    LDL Calculated 07/08/2022 87  mg/dL Final    Unable to calculate due to one of the following values:  Cholesterol <5  HDL Cholesterol <5  Triglycerides <10 or >400    LDL/HDL 07/08/2022 2.6   Final    Unable to calculate due to one of the following values:  Cholesterol <5  HDL Cholesterol <5  Triglycerides <10 or >400    VLDL 07/08/2022 15  mg/dL Final    Sodium 07/08/2022 137  136 - 145 mmol/L Final    Potassium 07/08/2022 4.0  3.5 - 5.1 mmol/L Final    Chloride 07/08/2022 100  98 - 107  mmol/L Final    CO2 07/08/2022 28  21 - 32 mmol/L Final    Anion Gap 07/08/2022 13  7 - 16 mmol/L Final    Glucose 07/08/2022 103  74 - 106 mg/dL Final    BUN 07/08/2022 12  7 - 18 mg/dL Final    Creatinine 07/08/2022 0.76  0.70 - 1.30 mg/dL Final    BUN/Creatinine Ratio 07/08/2022 16  6 - 20 Final    Calcium 07/08/2022 9.4  8.5 - 10.1 mg/dL Final    Total Protein 07/08/2022 8.0  6.4 - 8.2 g/dL Final    Albumin 07/08/2022 4.3  3.5 - 5.0 g/dL Final    Globulin 07/08/2022 3.7  2.0 - 4.0 g/dL Final    A/G Ratio 07/08/2022 1.2   Final    Bilirubin, Total 07/08/2022 2.5 (A) 0.0 - 1.2 mg/dL Final    Alk Phos 07/08/2022 102  45 - 115 U/L Final    ALT 07/08/2022 41  16 - 61 U/L Final    AST 07/08/2022 25  15 - 37 U/L Final    eGFR 07/08/2022 122  >=60 mL/min/1.73m² Final    Vitamin D 25-Hydroxy, Blood 07/08/2022 58.0  ng/mL Final    Vitamin D 25-OH Adult Reference Values:  Deficiency: <20 ng/mL  Insufficiency: 20 - <30 ng/mL  Sufficiency: 30 -100 ng/mL    Vitamin D 25-OH Pediatric Reference Values:  Deficiency: <15 ng/mL  Insufficiency: 15 - <20 ng/mL  Sufficiency: 20 - 100 ng/mL    Magnesium 07/08/2022 2.1  1.7 - 2.3 mg/dL Final       Objective:     Blood pressure 110/70, pulse 83, temperature 98.4 °F (36.9 °C), temperature source Oral, resp. rate 18, height 6' (1.829 m), weight 133.8 kg (295 lb), SpO2 95 %.      Physical Exam  Constitutional:       Appearance: Normal appearance.   HENT:      Head: Normocephalic and atraumatic.      Nose: Nose normal.      Mouth/Throat:      Mouth: Mucous membranes are moist.   Eyes:      Pupils: Pupils are equal, round, and reactive to light.   Cardiovascular:      Rate and Rhythm: Normal rate. Rhythm irregular.      Pulses: Normal pulses.   Pulmonary:      Effort: Pulmonary effort is normal.      Breath sounds: Normal breath sounds.   Abdominal:      General: Abdomen is flat.   Skin:     General: Skin is warm and dry.   Neurological:      General: No focal deficit  present.      Mental Status: He is alert and oriented to person, place, and time.   Psychiatric:         Mood and Affect: Mood normal.         Behavior: Behavior normal.         Thought Content: Thought content normal.         Judgment: Judgment normal.         Assessment:       Problem List Items Addressed This Visit        Endocrine    Pre-diabetes (Chronic)    Relevant Orders    CBC Auto Differential    Comprehensive Metabolic Panel    TSH      Other Visit Diagnoses     Routine general medical examination at a health care facility    -  Primary    Screening, lipid        Relevant Orders    Lipid Panel    Screening for diabetes mellitus        Relevant Orders    Hemoglobin A1C    Vitamin D deficiency        Relevant Orders    Vitamin D    Hyperlipidemia, unspecified hyperlipidemia type        Relevant Orders    CBC Auto Differential    Comprehensive Metabolic Panel    TSH          Plan:       RTC 6 months for HY #1 and all lab fasting. Overall health goal: Improve healthy choices and lose weight to normal BMI.     Health goals: Take medications as prescribed, eat well balance meals and avoid all tobacco products.     Everyone should plan to exercise / have physical activity increased for at least 30 minutes 5 days a week.

## 2022-10-20 RX ORDER — MECLIZINE HYDROCHLORIDE 25 MG/1
25 TABLET ORAL 3 TIMES DAILY PRN
Qty: 360 TABLET | Refills: 3 | Status: SHIPPED | OUTPATIENT
Start: 2022-10-20

## 2023-01-26 ENCOUNTER — OFFICE VISIT (OUTPATIENT)
Dept: FAMILY MEDICINE | Facility: CLINIC | Age: 38
End: 2023-01-26
Payer: COMMERCIAL

## 2023-01-26 VITALS
WEIGHT: 296 LBS | OXYGEN SATURATION: 98 % | RESPIRATION RATE: 20 BRPM | SYSTOLIC BLOOD PRESSURE: 126 MMHG | BODY MASS INDEX: 40.09 KG/M2 | TEMPERATURE: 98 F | HEIGHT: 72 IN | HEART RATE: 72 BPM | DIASTOLIC BLOOD PRESSURE: 70 MMHG

## 2023-01-26 DIAGNOSIS — R73.03 PRE-DIABETES: ICD-10-CM

## 2023-01-26 DIAGNOSIS — I48.11 LONGSTANDING PERSISTENT ATRIAL FIBRILLATION: ICD-10-CM

## 2023-01-26 DIAGNOSIS — E78.5 HYPERLIPIDEMIA, UNSPECIFIED HYPERLIPIDEMIA TYPE: ICD-10-CM

## 2023-01-26 DIAGNOSIS — E55.9 VITAMIN D DEFICIENCY: ICD-10-CM

## 2023-01-26 DIAGNOSIS — Z00.00 ROUTINE GENERAL MEDICAL EXAMINATION AT A HEALTH CARE FACILITY: Primary | ICD-10-CM

## 2023-01-26 PROCEDURE — 1159F MED LIST DOCD IN RCRD: CPT | Mod: ,,, | Performed by: FAMILY MEDICINE

## 2023-01-26 PROCEDURE — 3078F DIAST BP <80 MM HG: CPT | Mod: ,,, | Performed by: FAMILY MEDICINE

## 2023-01-26 PROCEDURE — 1159F PR MEDICATION LIST DOCUMENTED IN MEDICAL RECORD: ICD-10-PCS | Mod: ,,, | Performed by: FAMILY MEDICINE

## 2023-01-26 PROCEDURE — 3074F SYST BP LT 130 MM HG: CPT | Mod: ,,, | Performed by: FAMILY MEDICINE

## 2023-01-26 PROCEDURE — 3044F HG A1C LEVEL LT 7.0%: CPT | Mod: ,,, | Performed by: FAMILY MEDICINE

## 2023-01-26 PROCEDURE — 3008F BODY MASS INDEX DOCD: CPT | Mod: ,,, | Performed by: FAMILY MEDICINE

## 2023-01-26 PROCEDURE — 99395 PR PREVENTIVE VISIT,EST,18-39: ICD-10-PCS | Mod: ,,, | Performed by: FAMILY MEDICINE

## 2023-01-26 PROCEDURE — 3044F PR MOST RECENT HEMOGLOBIN A1C LEVEL <7.0%: ICD-10-PCS | Mod: ,,, | Performed by: FAMILY MEDICINE

## 2023-01-26 PROCEDURE — 99395 PREV VISIT EST AGE 18-39: CPT | Mod: ,,, | Performed by: FAMILY MEDICINE

## 2023-01-26 PROCEDURE — 3008F PR BODY MASS INDEX (BMI) DOCUMENTED: ICD-10-PCS | Mod: ,,, | Performed by: FAMILY MEDICINE

## 2023-01-26 PROCEDURE — 3074F PR MOST RECENT SYSTOLIC BLOOD PRESSURE < 130 MM HG: ICD-10-PCS | Mod: ,,, | Performed by: FAMILY MEDICINE

## 2023-01-26 PROCEDURE — 3078F PR MOST RECENT DIASTOLIC BLOOD PRESSURE < 80 MM HG: ICD-10-PCS | Mod: ,,, | Performed by: FAMILY MEDICINE

## 2023-01-26 NOTE — PROGRESS NOTES
Subjective     Patient ID: Eliecer Collins is a 38 y.o. male.    Chief Complaint: Healthy You    39 yo WM here for HY.     Review of Systems   Constitutional:  Negative for activity change.   Eyes:  Negative for visual disturbance.   Respiratory:  Negative for shortness of breath.    Cardiovascular:  Negative for chest pain.   Gastrointestinal:  Negative for abdominal pain.   Neurological:  Negative for headaches.   Psychiatric/Behavioral:  Negative for dysphoric mood.      Tobacco Use: Low Risk     Smoking Tobacco Use: Never    Smokeless Tobacco Use: Never    Passive Exposure: Never     Review of patient's allergies indicates:   Allergen Reactions    Aminophyllin     Cefaclor     Codeine     Flagyl [metronidazole] Diarrhea    Pcn [penicillins]     Sulfa (sulfonamide antibiotics)      Current Outpatient Medications   Medication Instructions    atorvastatin (LIPITOR) 20 mg, Oral, Daily    cetirizine (ZYRTEC) 10 mg, Oral, Daily    cholecalciferol (vitamin D3) 5,000 Units, Oral, Daily    diltiaZEM (CARDIZEM CD) 240 mg, Oral, Daily    furosemide (LASIX) 40 mg, Oral, Daily    meclizine (ANTIVERT) 25 mg, Oral, 3 times daily PRN, 1/2 to one tablet 4 times a day as needed    metoprolol succinate (TOPROL-XL) 100 mg, Oral, Daily    montelukast (SINGULAIR) 10 mg, Oral, Nightly    potassium chloride SA (K-DUR,KLOR-CON) 20 MEQ tablet 20 mEq, Oral, Daily    XARELTO 20 mg, Oral, Daily     There are no discontinued medications.    Past Medical History:   Diagnosis Date    Atrial fibrillation     Encounter for long-term (current) use of other medications     Hyperglycemia     Hyperlipidemia     Vertigo     Vitamin D deficiency      Health Maintenance Topics with due status: Not Due       Topic Last Completion Date    TETANUS VACCINE 05/30/2019    Hemoglobin A1c (Prediabetes) 01/24/2023    Lipid Panel 01/24/2023     Immunization History   Administered Date(s) Administered    COVID-19 MRNA, LN-S PF (MODERNA HALF 0.25 ML DOSE) 10/29/2021     COVID-19, MRNA, LN-S, PF (MODERNA FULL 0.5 ML DOSE) 02/05/2021, 03/05/2021    Hepatitis B, Adult 06/14/1998    Influenza - Quadrivalent 09/29/2020    Influenza - Quadrivalent - MDCK 10/01/2022    Influenza - Quadrivalent - PF *Preferred* (6 months and older) 10/01/2021    Meningococcal Polysaccharide (23-Valent) (MPSV4) 06/14/2004    Pneumococcal Polysaccharide - 23 Valent 01/25/2013    Tdap 05/30/2019       Objective     Body mass index is 40.14 kg/m².  Wt Readings from Last 3 Encounters:   01/26/23 134.3 kg (296 lb)   07/25/22 133.8 kg (295 lb)   01/26/22 134.7 kg (297 lb)     Ht Readings from Last 3 Encounters:   01/26/23 6' (1.829 m)   07/25/22 6' (1.829 m)   01/26/22 6' (1.829 m)     BP Readings from Last 3 Encounters:   01/26/23 126/70   07/25/22 110/70   01/26/22 118/65     Temp Readings from Last 3 Encounters:   01/26/23 98.3 °F (36.8 °C) (Oral)   07/25/22 98.4 °F (36.9 °C) (Oral)   01/26/22 97.1 °F (36.2 °C) (Skin)     Pulse Readings from Last 3 Encounters:   01/26/23 72   07/25/22 83   01/26/22 85     Resp Readings from Last 3 Encounters:   01/26/23 20   07/25/22 18   01/26/22 18     PF Readings from Last 3 Encounters:   No data found for PF     Lab Visit on 01/24/2023   Component Date Value Ref Range Status    Hemoglobin A1C 01/24/2023 6.1  4.5 - 6.6 % Final      Normal:               <5.7%  Pre-Diabetic:       5.7% to 6.4%  Diabetic:             >6.4%  Diabetic Goal:     <7%    Estimated Average Glucose 01/24/2023 117  mg/dL Final    Triglycerides 01/24/2023 67  35 - 150 mg/dL Final      Normal:  <150 mg/dL  Borderline High: 150-199 mg/dL  High:   200-499 mg/dL  Very High:  >=500    Cholesterol 01/24/2023 126  0 - 200 mg/dL Final      <200 mg/dL:  Desirable  200-240 mg/dL: Borderline High  >240 mg/dL:  High    HDL Cholesterol 01/24/2023 33 (L)  40 - 60 mg/dL Final      <40 mg/dL: Low HDL  40-60 mg/dL: Normal  >60 mg/dL: Desirable    Cholesterol/HDL Ratio (Risk Factor) 01/24/2023 3.8   Final    Non-HDL  01/24/2023 93  mg/dL Final    LDL Calculated 01/24/2023 80  mg/dL Final    Unable to calculate due to one of the following values:  Cholesterol <5  HDL Cholesterol <5  Triglycerides <10 or >400    LDL/HDL 01/24/2023 2.4   Final    Unable to calculate due to one of the following values:  Cholesterol <5  HDL Cholesterol <5  Triglycerides <10 or >400    VLDL 01/24/2023 13  mg/dL Final    Sodium 01/24/2023 140  136 - 145 mmol/L Final    Potassium 01/24/2023 4.7  3.5 - 5.1 mmol/L Final    Chloride 01/24/2023 104  98 - 107 mmol/L Final    CO2 01/24/2023 35 (H)  21 - 32 mmol/L Final    Anion Gap 01/24/2023 6 (L)  7 - 16 mmol/L Final    Glucose 01/24/2023 100  74 - 106 mg/dL Final    BUN 01/24/2023 10  7 - 18 mg/dL Final    Creatinine 01/24/2023 0.81  0.70 - 1.30 mg/dL Final    BUN/Creatinine Ratio 01/24/2023 12  6 - 20 Final    Calcium 01/24/2023 9.4  8.5 - 10.1 mg/dL Final    Total Protein 01/24/2023 7.8  6.4 - 8.2 g/dL Final    Albumin 01/24/2023 4.1  3.5 - 5.0 g/dL Final    Globulin 01/24/2023 3.7  2.0 - 4.0 g/dL Final    A/G Ratio 01/24/2023 1.1   Final    Bilirubin, Total 01/24/2023 1.8 (H)  >0.0 - 1.2 mg/dL Final    Alk Phos 01/24/2023 98  45 - 115 U/L Final    ALT 01/24/2023 33  16 - 61 U/L Final    AST 01/24/2023 22  15 - 37 U/L Final    eGFR 01/24/2023 115  >=60 mL/min/1.73m² Final    TSH 01/24/2023 2.650  0.358 - 3.740 uIU/mL Final    Vitamin D 25-Hydroxy, Blood 01/24/2023 56.8  ng/mL Final    Vitamin D 25-OH Adult Reference Values:  Deficiency: <20 ng/mL  Insufficiency: 20 - <30 ng/mL  Sufficiency: 30 -100 ng/mL    Vitamin D 25-OH Pediatric Reference Values:  Deficiency: <15 ng/mL  Insufficiency: 15 - <20 ng/mL  Sufficiency: 20 - 100 ng/mL    WBC 01/24/2023 8.83  4.50 - 11.00 K/uL Final    RBC 01/24/2023 5.75  4.60 - 6.20 M/uL Final    Hemoglobin 01/24/2023 15.8  13.5 - 18.0 g/dL Final    Hematocrit 01/24/2023 49.7  40.0 - 54.0 % Final    MCV 01/24/2023 86.4  80.0 - 96.0 fL Final    MCH 01/24/2023 27.5  27.0 -  31.0 pg Final    MCHC 01/24/2023 31.8 (L)  32.0 - 36.0 g/dL Final    RDW 01/24/2023 13.3  11.5 - 14.5 % Final    Platelet Count 01/24/2023 329  150 - 400 K/uL Final    MPV 01/24/2023 11.1  9.4 - 12.4 fL Final    Neutrophils % 01/24/2023 67.9 (H)  53.0 - 65.0 % Final    Lymphocytes % 01/24/2023 19.8 (L)  27.0 - 41.0 % Final    Monocytes % 01/24/2023 8.5 (H)  2.0 - 6.0 % Final    Eosinophils % 01/24/2023 2.7  1.0 - 4.0 % Final    Basophils % 01/24/2023 1.0  0.0 - 1.0 % Final    Immature Granulocytes % 01/24/2023 0.1  0.0 - 0.4 % Final    nRBC, Auto 01/24/2023 0.0  <=0.0 % Final    Neutrophils, Abs 01/24/2023 5.99  1.80 - 7.70 K/uL Final    Lymphocytes, Absolute 01/24/2023 1.75  1.00 - 4.80 K/uL Final    Monocytes, Absolute 01/24/2023 0.75  0.00 - 0.80 K/uL Final    Eosinophils, Absolute 01/24/2023 0.24  0.00 - 0.50 K/uL Final    Basophils, Absolute 01/24/2023 0.09  0.00 - 0.20 K/uL Final    Immature Granulocytes, Absolute 01/24/2023 0.01  0.00 - 0.04 K/uL Final    nRBC, Absolute 01/24/2023 0.00  <=0.00 x10e3/uL Final    Diff Type 01/24/2023 Auto   Final     Blood pressure 126/70, pulse 72, temperature 98.3 °F (36.8 °C), temperature source Oral, resp. rate 20, height 6' (1.829 m), weight 134.3 kg (296 lb), SpO2 98 %.      Physical Exam  Constitutional:       Appearance: Normal appearance.   HENT:      Head: Normocephalic and atraumatic.      Nose: Nose normal.      Mouth/Throat:      Mouth: Mucous membranes are moist.   Eyes:      Pupils: Pupils are equal, round, and reactive to light.   Cardiovascular:      Rate and Rhythm: Normal rate. Rhythm irregular.      Pulses: Normal pulses.      Heart sounds: Normal heart sounds.      Comments: Atrial fib and has a defibrillator  Pulmonary:      Effort: Pulmonary effort is normal.      Breath sounds: Normal breath sounds.   Abdominal:      General: Abdomen is flat.   Skin:     General: Skin is warm and dry.   Neurological:      General: No focal deficit present.      Mental  Status: He is alert and oriented to person, place, and time.   Psychiatric:         Mood and Affect: Mood normal.         Behavior: Behavior normal.         Thought Content: Thought content normal.         Judgment: Judgment normal.       Assessment and Plan     Problem List Items Addressed This Visit          Cardiac/Vascular    Longstanding persistent atrial fibrillation    Relevant Orders    Urinalysis    Hemoglobin A1C    Lipid Panel    CBC Auto Differential    Comprehensive Metabolic Panel       Endocrine    Pre-diabetes (Chronic)    Relevant Orders    Urinalysis    Hemoglobin A1C    Lipid Panel    CBC Auto Differential    Comprehensive Metabolic Panel     Other Visit Diagnoses       Routine general medical examination at a health care facility    -  Primary    Vitamin D deficiency        Hyperlipidemia, unspecified hyperlipidemia type        Relevant Orders    Urinalysis    Hemoglobin A1C    Lipid Panel    CBC Auto Differential    Comprehensive Metabolic Panel            Plan: RTC 6 months for HY again and other lab.     have  I have reviewed the medications, allergies, and problem list.     Goal Actions:    What type of visit is the patient here for today?: Healthy You  Does the patient consent to enroll in [facility] Healthy?: Yes  Is this a Wellness Follow Up?: No  What is your overall wellness goal? (select at least one): Improve overall health  Choose 3: Biometric, Lifestyle, Nutrition  Biometric Actions: Attend regularly scheduled office visits  Lifestyle Actions : Take medications as prescribed  Nurtrition Actions: Recommend weight loss

## 2023-05-25 DIAGNOSIS — I48.11 LONGSTANDING PERSISTENT ATRIAL FIBRILLATION: ICD-10-CM

## 2023-05-25 DIAGNOSIS — J30.89 NON-SEASONAL ALLERGIC RHINITIS, UNSPECIFIED TRIGGER: ICD-10-CM

## 2023-05-25 DIAGNOSIS — E78.5 HYPERLIPIDEMIA, UNSPECIFIED HYPERLIPIDEMIA TYPE: ICD-10-CM

## 2023-05-25 DIAGNOSIS — E87.6 HYPOKALEMIA: ICD-10-CM

## 2023-05-25 RX ORDER — DILTIAZEM HYDROCHLORIDE 240 MG/1
240 CAPSULE, COATED, EXTENDED RELEASE ORAL DAILY
Qty: 90 CAPSULE | Refills: 3 | Status: SHIPPED | OUTPATIENT
Start: 2023-05-25 | End: 2024-05-24

## 2023-05-25 RX ORDER — FUROSEMIDE 40 MG/1
40 TABLET ORAL DAILY
Qty: 90 TABLET | Refills: 3 | Status: SHIPPED | OUTPATIENT
Start: 2023-05-25 | End: 2024-05-24

## 2023-05-25 RX ORDER — MONTELUKAST SODIUM 10 MG/1
10 TABLET ORAL NIGHTLY
Qty: 90 TABLET | Refills: 3 | Status: SHIPPED | OUTPATIENT
Start: 2023-05-25

## 2023-05-25 RX ORDER — POTASSIUM CHLORIDE 20 MEQ/1
20 TABLET, EXTENDED RELEASE ORAL DAILY
Qty: 90 TABLET | Refills: 3 | Status: SHIPPED | OUTPATIENT
Start: 2023-05-25

## 2023-05-25 RX ORDER — ATORVASTATIN CALCIUM 20 MG/1
20 TABLET, FILM COATED ORAL DAILY
Qty: 90 TABLET | Refills: 3 | Status: SHIPPED | OUTPATIENT
Start: 2023-05-25 | End: 2024-05-24

## 2023-05-25 RX ORDER — METOPROLOL SUCCINATE 100 MG/1
100 TABLET, EXTENDED RELEASE ORAL DAILY
Qty: 90 TABLET | Refills: 3 | Status: SHIPPED | OUTPATIENT
Start: 2023-05-25 | End: 2024-05-24

## 2023-05-25 RX ORDER — RIVAROXABAN 20 MG/1
20 TABLET, FILM COATED ORAL DAILY
Qty: 90 TABLET | Refills: 3 | Status: SHIPPED | OUTPATIENT
Start: 2023-05-25

## 2023-07-14 ENCOUNTER — OFFICE VISIT (OUTPATIENT)
Dept: FAMILY MEDICINE | Facility: CLINIC | Age: 38
End: 2023-07-14
Payer: COMMERCIAL

## 2023-07-14 VITALS
RESPIRATION RATE: 20 BRPM | HEIGHT: 72 IN | HEART RATE: 81 BPM | SYSTOLIC BLOOD PRESSURE: 133 MMHG | WEIGHT: 294 LBS | TEMPERATURE: 98 F | BODY MASS INDEX: 39.82 KG/M2 | OXYGEN SATURATION: 94 % | DIASTOLIC BLOOD PRESSURE: 70 MMHG

## 2023-07-14 DIAGNOSIS — Z00.00 ROUTINE GENERAL MEDICAL EXAMINATION AT A HEALTH CARE FACILITY: Primary | ICD-10-CM

## 2023-07-14 PROCEDURE — 3008F BODY MASS INDEX DOCD: CPT | Mod: ,,, | Performed by: FAMILY MEDICINE

## 2023-07-14 PROCEDURE — 1159F PR MEDICATION LIST DOCUMENTED IN MEDICAL RECORD: ICD-10-PCS | Mod: ,,, | Performed by: FAMILY MEDICINE

## 2023-07-14 PROCEDURE — 1159F MED LIST DOCD IN RCRD: CPT | Mod: ,,, | Performed by: FAMILY MEDICINE

## 2023-07-14 PROCEDURE — 99395 PR PREVENTIVE VISIT,EST,18-39: ICD-10-PCS | Mod: ,,, | Performed by: FAMILY MEDICINE

## 2023-07-14 PROCEDURE — 99395 PREV VISIT EST AGE 18-39: CPT | Mod: ,,, | Performed by: FAMILY MEDICINE

## 2023-07-14 PROCEDURE — 3044F PR MOST RECENT HEMOGLOBIN A1C LEVEL <7.0%: ICD-10-PCS | Mod: ,,, | Performed by: FAMILY MEDICINE

## 2023-07-14 PROCEDURE — 3075F SYST BP GE 130 - 139MM HG: CPT | Mod: ,,, | Performed by: FAMILY MEDICINE

## 2023-07-14 PROCEDURE — 3078F DIAST BP <80 MM HG: CPT | Mod: ,,, | Performed by: FAMILY MEDICINE

## 2023-07-14 PROCEDURE — 3008F PR BODY MASS INDEX (BMI) DOCUMENTED: ICD-10-PCS | Mod: ,,, | Performed by: FAMILY MEDICINE

## 2023-07-14 PROCEDURE — 3044F HG A1C LEVEL LT 7.0%: CPT | Mod: ,,, | Performed by: FAMILY MEDICINE

## 2023-07-14 PROCEDURE — 3075F PR MOST RECENT SYSTOLIC BLOOD PRESS GE 130-139MM HG: ICD-10-PCS | Mod: ,,, | Performed by: FAMILY MEDICINE

## 2023-07-14 PROCEDURE — 3078F PR MOST RECENT DIASTOLIC BLOOD PRESSURE < 80 MM HG: ICD-10-PCS | Mod: ,,, | Performed by: FAMILY MEDICINE

## 2023-07-14 RX ORDER — ACETAMINOPHEN 500 MG
500 TABLET ORAL EVERY 6 HOURS PRN
COMMUNITY

## 2023-07-14 NOTE — PROGRESS NOTES
Subjective     Patient ID: Eliecer Collins is a 38 y.o. male.    Chief Complaint: Healthy You    39 yo WM here for HY #2 and lab. Has 4.5 years left on defibrillator battery.     Review of Systems   Constitutional:  Negative for activity change.   Eyes:  Negative for visual disturbance.   Respiratory:  Negative for shortness of breath.    Cardiovascular:  Negative for chest pain.   Gastrointestinal:  Negative for abdominal pain.   Neurological:  Negative for headaches.   Psychiatric/Behavioral:  Negative for dysphoric mood.      Tobacco Use: Low Risk     Smoking Tobacco Use: Never    Smokeless Tobacco Use: Never    Passive Exposure: Never     Review of patient's allergies indicates:   Allergen Reactions    Aminophyllin     Cefaclor     Codeine     Flagyl [metronidazole] Diarrhea    Pcn [penicillins]     Sulfa (sulfonamide antibiotics)      Current Outpatient Medications   Medication Instructions    acetaminophen (TYLENOL) 500 mg, Oral, Every 6 hours PRN    atorvastatin (LIPITOR) 20 mg, Oral, Daily    cetirizine (ZYRTEC) 10 mg, Oral, Daily    cholecalciferol (vitamin D3) 5,000 Units, Oral, Daily    diltiaZEM (CARDIZEM CD) 240 mg, Oral, Daily    furosemide (LASIX) 40 mg, Oral, Daily    meclizine (ANTIVERT) 25 mg, Oral, 3 times daily PRN, 1/2 to one tablet 4 times a day as needed    metoprolol succinate (TOPROL-XL) 100 mg, Oral, Daily    montelukast (SINGULAIR) 10 mg, Oral, Nightly    potassium chloride SA (K-DUR,KLOR-CON) 20 MEQ tablet 20 mEq, Oral, Daily    XARELTO 20 mg, Oral, Daily     There are no discontinued medications.    Past Medical History:   Diagnosis Date    Atrial fibrillation     Encounter for long-term (current) use of other medications     Hyperglycemia     Hyperlipidemia     Renal vein bleed     Vertigo     Vitamin D deficiency      Health Maintenance Topics with due status: Not Due       Topic Last Completion Date    TETANUS VACCINE 05/30/2019    Influenza Vaccine 10/01/2022    Hemoglobin A1c  (Prediabetes) 07/05/2023    Lipid Panel 07/05/2023     Immunization History   Administered Date(s) Administered    COVID-19 MRNA, LN-S PF (MODERNA HALF 0.25 ML DOSE) 10/29/2021    COVID-19, MRNA, LN-S, PF (MODERNA FULL 0.5 ML DOSE) 02/05/2021, 03/05/2021    COVID-19, mRNA, LNP-S, bivalent booster, PF (Moderna Omicron) 01/27/2023    Hepatitis B, Adult 06/14/1998    Influenza - Quadrivalent 09/29/2020    Influenza - Quadrivalent - MDCK 10/01/2022    Influenza - Quadrivalent - PF *Preferred* (6 months and older) 10/01/2021    Meningococcal Polysaccharide (23-Valent) (MPSV4) 06/14/2004    Pneumococcal Polysaccharide - 23 Valent 01/25/2013    Tdap 05/30/2019       Objective     Body mass index is 39.87 kg/m².  Wt Readings from Last 3 Encounters:   07/14/23 133.4 kg (294 lb)   01/26/23 134.3 kg (296 lb)   07/25/22 133.8 kg (295 lb)     Ht Readings from Last 3 Encounters:   07/14/23 6' (1.829 m)   01/26/23 6' (1.829 m)   07/25/22 6' (1.829 m)     BP Readings from Last 3 Encounters:   07/14/23 133/70   01/26/23 126/70   07/25/22 110/70     Temp Readings from Last 3 Encounters:   07/14/23 98.2 °F (36.8 °C) (Oral)   01/26/23 98.3 °F (36.8 °C) (Oral)   07/25/22 98.4 °F (36.9 °C) (Oral)     Pulse Readings from Last 3 Encounters:   07/14/23 81   01/26/23 72   07/25/22 83     Resp Readings from Last 3 Encounters:   07/14/23 20   01/26/23 20   07/25/22 18     PF Readings from Last 3 Encounters:   No data found for PF     No visits with results within 1 Week(s) from this visit.   Latest known visit with results is:   Lab Visit on 07/05/2023   Component Date Value Ref Range Status    Color,  07/05/2023 Colorless  Colorless, Straw, Yellow, Light Yellow, Dark Yellow Final    Clarity,  07/05/2023 Clear  Clear Final    pH,  07/05/2023 5.5  5.0 to 8.0 pH Units Final    Leukocytes,  07/05/2023 Negative  Negative Final    Nitrites,  07/05/2023 Negative  Negative Final    Protein,  07/05/2023 Negative  Negative Final    Glucose,  UA 07/05/2023 Normal  Normal mg/dL Final    Ketones, UA 07/05/2023 Negative  Negative mg/dL Final    Urobilinogen, UA 07/05/2023 Normal  0.2, 1.0, Normal mg/dL Final    Bilirubin, UA 07/05/2023 Negative  Negative Final    Blood, UA 07/05/2023 Negative  Negative Final    Specific Gravity, UA 07/05/2023 1.008  <=1.030 Final     Blood pressure 133/70, pulse 81, temperature 98.2 °F (36.8 °C), temperature source Oral, resp. rate 20, height 6' (1.829 m), weight 133.4 kg (294 lb), SpO2 (!) 94 %.      Physical Exam  Constitutional:       Appearance: Normal appearance.   HENT:      Head: Normocephalic and atraumatic.      Nose: Nose normal.      Mouth/Throat:      Mouth: Mucous membranes are moist.   Eyes:      Pupils: Pupils are equal, round, and reactive to light.   Cardiovascular:      Rate and Rhythm: Normal rate. Rhythm irregular.      Pulses: Normal pulses.      Heart sounds: Normal heart sounds.   Pulmonary:      Effort: Pulmonary effort is normal.      Breath sounds: Normal breath sounds.   Abdominal:      General: Abdomen is flat.   Skin:     General: Skin is warm and dry.   Neurological:      General: No focal deficit present.      Mental Status: He is alert and oriented to person, place, and time.   Psychiatric:         Mood and Affect: Mood normal.         Behavior: Behavior normal.         Thought Content: Thought content normal.         Judgment: Judgment normal.       Assessment and Plan     Problem List Items Addressed This Visit    None  Visit Diagnoses       Routine general medical examination at a health care facility    -  Primary            Plan: RTC 6 months for HY again.       I have reviewed the medications, allergies, and problem list.     Goal Actions:    What type of visit is the patient here for today?: Healthy You  Does the patient consent to enroll in [employer] Healthy?: Yes  Is this a Wellness Follow Up?: Yes  What is your overall wellness goal? (select at least one): Lifestyle modifications,  Lose weight, Understand disease process, Improve overall health  Choose 3: Lifestyle, Nutrition, Exercise  Lifestyle Actions : Take medications as prescribed, Develop good support system, BMD if applicable  Nurtrition Actions: Eat a well-balanced diet, Recommend weight loss, Read nutrition labels, drink 8-10 glasses of water per day  Exercise Actions: Recommend physical activity 30 minutes per day 3-5 times/week

## 2023-07-17 ENCOUNTER — PATIENT OUTREACH (OUTPATIENT)
Dept: ADMINISTRATIVE | Facility: HOSPITAL | Age: 38
End: 2023-07-17

## 2023-07-17 NOTE — PROGRESS NOTES
Post visit Population Health review of encounter with date of service  7/14 with .  All required HY components in encounter.    Added myself to kd

## 2023-09-11 ENCOUNTER — OFFICE VISIT (OUTPATIENT)
Dept: FAMILY MEDICINE | Facility: CLINIC | Age: 38
End: 2023-09-11
Payer: COMMERCIAL

## 2023-09-11 VITALS
BODY MASS INDEX: 40.23 KG/M2 | TEMPERATURE: 99 F | RESPIRATION RATE: 18 BRPM | WEIGHT: 297 LBS | HEART RATE: 93 BPM | HEIGHT: 72 IN | DIASTOLIC BLOOD PRESSURE: 89 MMHG | OXYGEN SATURATION: 95 % | SYSTOLIC BLOOD PRESSURE: 132 MMHG

## 2023-09-11 DIAGNOSIS — Z20.822 EXPOSURE TO COVID-19 VIRUS: ICD-10-CM

## 2023-09-11 DIAGNOSIS — J06.9 UPPER RESPIRATORY TRACT INFECTION, UNSPECIFIED TYPE: Primary | ICD-10-CM

## 2023-09-11 PROCEDURE — 87428 SARSCOV & INF VIR A&B AG IA: CPT | Mod: QW,,, | Performed by: FAMILY MEDICINE

## 2023-09-11 PROCEDURE — 3075F SYST BP GE 130 - 139MM HG: CPT | Mod: ,,, | Performed by: FAMILY MEDICINE

## 2023-09-11 PROCEDURE — 1160F PR REVIEW ALL MEDS BY PRESCRIBER/CLIN PHARMACIST DOCUMENTED: ICD-10-PCS | Mod: ,,, | Performed by: FAMILY MEDICINE

## 2023-09-11 PROCEDURE — 3079F PR MOST RECENT DIASTOLIC BLOOD PRESSURE 80-89 MM HG: ICD-10-PCS | Mod: ,,, | Performed by: FAMILY MEDICINE

## 2023-09-11 PROCEDURE — 96372 THER/PROPH/DIAG INJ SC/IM: CPT | Mod: ,,, | Performed by: FAMILY MEDICINE

## 2023-09-11 PROCEDURE — 1159F PR MEDICATION LIST DOCUMENTED IN MEDICAL RECORD: ICD-10-PCS | Mod: ,,, | Performed by: FAMILY MEDICINE

## 2023-09-11 PROCEDURE — 1160F RVW MEDS BY RX/DR IN RCRD: CPT | Mod: ,,, | Performed by: FAMILY MEDICINE

## 2023-09-11 PROCEDURE — 3008F PR BODY MASS INDEX (BMI) DOCUMENTED: ICD-10-PCS | Mod: ,,, | Performed by: FAMILY MEDICINE

## 2023-09-11 PROCEDURE — 3008F BODY MASS INDEX DOCD: CPT | Mod: ,,, | Performed by: FAMILY MEDICINE

## 2023-09-11 PROCEDURE — 99213 PR OFFICE/OUTPT VISIT, EST, LEVL III, 20-29 MIN: ICD-10-PCS | Mod: 25,,, | Performed by: FAMILY MEDICINE

## 2023-09-11 PROCEDURE — 3044F PR MOST RECENT HEMOGLOBIN A1C LEVEL <7.0%: ICD-10-PCS | Mod: ,,, | Performed by: FAMILY MEDICINE

## 2023-09-11 PROCEDURE — 3075F PR MOST RECENT SYSTOLIC BLOOD PRESS GE 130-139MM HG: ICD-10-PCS | Mod: ,,, | Performed by: FAMILY MEDICINE

## 2023-09-11 PROCEDURE — 1159F MED LIST DOCD IN RCRD: CPT | Mod: ,,, | Performed by: FAMILY MEDICINE

## 2023-09-11 PROCEDURE — 87428 POCT SARS-COV2 (COVID) WITH FLU ANTIGEN: ICD-10-PCS | Mod: QW,,, | Performed by: FAMILY MEDICINE

## 2023-09-11 PROCEDURE — 3079F DIAST BP 80-89 MM HG: CPT | Mod: ,,, | Performed by: FAMILY MEDICINE

## 2023-09-11 PROCEDURE — 96372 PR INJECTION,THERAP/PROPH/DIAG2ST, IM OR SUBCUT: ICD-10-PCS | Mod: ,,, | Performed by: FAMILY MEDICINE

## 2023-09-11 PROCEDURE — 99213 OFFICE O/P EST LOW 20 MIN: CPT | Mod: 25,,, | Performed by: FAMILY MEDICINE

## 2023-09-11 PROCEDURE — 3044F HG A1C LEVEL LT 7.0%: CPT | Mod: ,,, | Performed by: FAMILY MEDICINE

## 2023-09-11 RX ORDER — DEXAMETHASONE SODIUM PHOSPHATE 4 MG/ML
4 INJECTION, SOLUTION INTRA-ARTICULAR; INTRALESIONAL; INTRAMUSCULAR; INTRAVENOUS; SOFT TISSUE ONCE
Status: COMPLETED | OUTPATIENT
Start: 2023-09-11 | End: 2023-09-11

## 2023-09-11 RX ORDER — AZITHROMYCIN 250 MG/1
TABLET, FILM COATED ORAL
Qty: 6 TABLET | Refills: 0 | Status: SHIPPED | OUTPATIENT
Start: 2023-09-11 | End: 2024-01-18

## 2023-09-11 RX ORDER — METHYLPREDNISOLONE 4 MG/1
TABLET ORAL
Qty: 1 EACH | Refills: 0 | Status: SHIPPED | OUTPATIENT
Start: 2023-09-11 | End: 2024-01-18

## 2023-09-11 RX ADMIN — DEXAMETHASONE SODIUM PHOSPHATE 4 MG: 4 INJECTION, SOLUTION INTRA-ARTICULAR; INTRALESIONAL; INTRAMUSCULAR; INTRAVENOUS; SOFT TISSUE at 02:09

## 2023-09-11 NOTE — LETTER
September 11, 2023      [facility]  [address] [facility] [place] [address]  Phone: [phone]  Fax: [phone]       Patient: Eliecer Collins   YOB: 1985  Date of Visit: 09/11/2023    To Whom It May Concern:    RICCO Collins  was at [facility] on 09/11/2023. The patient may return to work/school on 9/13/2023 with no restrictions. If you have any questions or concerns, or if I can be of further assistance, please do not hesitate to contact me.    Sincerely,    Darlene Ramirez MD

## 2023-09-11 NOTE — MEDICAL/APP STUDENT
Subjective     Patient ID: Eliecer Collins is a 38 y.o. male.    Chief Complaint: Sinus Problem, Cough, Nasal Congestion, and Chest Congestion    Patient is a 37 yo WM presenting with cough, congestion, and drainage since Saturday morning. Also reports sinus headaches. Has been taking tylenol and says it has been helping. Past medical history of atrial fibrillation.     Sinus Problem  Associated symptoms include coughing and sinus pressure.   Cough  Associated symptoms include postnasal drip. Pertinent negatives include no chest pain.     Review of Systems   Constitutional:  Negative for activity change.   HENT:  Positive for postnasal drip and sinus pressure/congestion.    Eyes:  Negative for visual disturbance.   Respiratory:  Positive for cough.    Cardiovascular:  Negative for chest pain.   Psychiatric/Behavioral: Negative.       Office Visit on 09/11/2023   Component Date Value Ref Range Status    SARS Coronavirus 2 Antigen 09/11/2023 Negative  Negative Final    Rapid Influenza A Ag 09/11/2023 Negative  Negative Final    Rapid Influenza B Ag 09/11/2023 Negative  Negative Final     Acceptable 09/11/2023 Yes   Final          Objective     Vitals:    09/11/23 1327   BP: 132/89   Pulse: 93   Resp: 18   Temp: 98.8 °F (37.1 °C)       Physical Exam  Constitutional:       Appearance: Normal appearance.   HENT:      Head: Normocephalic and atraumatic.      Nose: Congestion present.      Mouth/Throat:      Mouth: Mucous membranes are moist.   Cardiovascular:      Rate and Rhythm: Normal rate. Rhythm irregular.      Pulses: Normal pulses.      Heart sounds: Normal heart sounds.   Pulmonary:      Effort: Pulmonary effort is normal.   Skin:     General: Skin is warm and dry.   Neurological:      General: No focal deficit present.      Mental Status: He is alert and oriented to person, place, and time.   Psychiatric:         Mood and Affect: Mood normal.         Behavior: Behavior normal.            Assessment and  Plan     1. Exposure to COVID-19 virus  -     POCT SARS-COV2 (COVID) with Flu Antigen    2. Upper respiratory tract infection, unspecified type  -     dexAMETHasone injection 4 mg  -     azithromycin (ZITHROMAX Z-MICHELLE) 250 MG tablet; Take 2 tablets by mouth on Day 1 and then 1 tablet by mouth daily on day 2 through 6.  Dispense: 6 tablet; Refill: 0  -     methylPREDNISolone (MEDROL DOSEPACK) 4 mg tablet; use as directed  Dispense: 1 each; Refill: 0        RTC PRN     Follow up if symptoms worsen or fail to improve

## 2023-10-22 NOTE — PROGRESS NOTES
Subjective     Patient ID: Eliecer Collins is a 38 y.o. male.    Chief Complaint: Sinus Problem, Cough, Nasal Congestion, and Chest Congestion    37 yo WM here with complaints of several days of URI symptoms.     Sinus Problem  Associated symptoms include coughing. Pertinent negatives include no headaches or shortness of breath.   Cough  Pertinent negatives include no chest pain, headaches or shortness of breath.     Review of Systems   Constitutional:  Negative for activity change.   Eyes:  Negative for visual disturbance.   Respiratory:  Positive for cough. Negative for shortness of breath.    Cardiovascular:  Negative for chest pain.   Gastrointestinal:  Negative for abdominal pain.   Neurological:  Negative for headaches.   Psychiatric/Behavioral:  Negative for dysphoric mood.      Office Visit on 09/11/2023   Component Date Value Ref Range Status    SARS Coronavirus 2 Antigen 09/11/2023 Negative  Negative Final    Rapid Influenza A Ag 09/11/2023 Negative  Negative Final    Rapid Influenza B Ag 09/11/2023 Negative  Negative Final     Acceptable 09/11/2023 Yes   Final          Objective   Blood pressure 132/89, pulse 93, temperature 98.8 °F (37.1 °C), temperature source Oral, resp. rate 18, height 6' (1.829 m), weight 134.7 kg (297 lb), SpO2 95 %.    Physical Exam  Constitutional:       Appearance: Normal appearance.   HENT:      Head: Normocephalic and atraumatic.      Nose: Congestion present.      Mouth/Throat:      Mouth: Mucous membranes are moist.   Eyes:      Pupils: Pupils are equal, round, and reactive to light.   Cardiovascular:      Rate and Rhythm: Normal rate and regular rhythm.   Pulmonary:      Effort: Pulmonary effort is normal.   Abdominal:      General: Abdomen is flat.   Skin:     General: Skin is warm.   Neurological:      General: No focal deficit present.      Mental Status: He is alert.   Psychiatric:         Mood and Affect: Mood normal.            Assessment and Plan     1.  Upper respiratory tract infection, unspecified type  -     dexAMETHasone injection 4 mg  -     azithromycin (ZITHROMAX Z-MICHELLE) 250 MG tablet; Take 2 tablets by mouth on Day 1 and then 1 tablet by mouth daily on day 2 through 6.  Dispense: 6 tablet; Refill: 0  -     methylPREDNISolone (MEDROL DOSEPACK) 4 mg tablet; use as directed  Dispense: 1 each; Refill: 0    2. Exposure to COVID-19 virus  -     POCT SARS-COV2 (COVID) with Flu Antigen        RTC prn.          Follow up if symptoms worsen or fail to improve.

## 2024-01-18 ENCOUNTER — OFFICE VISIT (OUTPATIENT)
Dept: FAMILY MEDICINE | Facility: CLINIC | Age: 39
End: 2024-01-18
Payer: COMMERCIAL

## 2024-01-18 VITALS
TEMPERATURE: 99 F | SYSTOLIC BLOOD PRESSURE: 119 MMHG | HEIGHT: 72 IN | BODY MASS INDEX: 39.28 KG/M2 | HEART RATE: 76 BPM | WEIGHT: 290 LBS | DIASTOLIC BLOOD PRESSURE: 83 MMHG | RESPIRATION RATE: 18 BRPM | OXYGEN SATURATION: 96 %

## 2024-01-18 DIAGNOSIS — Z13.220 SCREENING, LIPID: ICD-10-CM

## 2024-01-18 DIAGNOSIS — E55.9 VITAMIN D DEFICIENCY: ICD-10-CM

## 2024-01-18 DIAGNOSIS — E78.2 MIXED HYPERLIPIDEMIA: ICD-10-CM

## 2024-01-18 DIAGNOSIS — R73.03 PRE-DIABETES: ICD-10-CM

## 2024-01-18 DIAGNOSIS — Z00.00 ROUTINE GENERAL MEDICAL EXAMINATION AT A HEALTH CARE FACILITY: Primary | ICD-10-CM

## 2024-01-18 DIAGNOSIS — Z13.1 SCREENING FOR DIABETES MELLITUS: ICD-10-CM

## 2024-01-18 PROCEDURE — 3074F SYST BP LT 130 MM HG: CPT | Mod: ,,, | Performed by: FAMILY MEDICINE

## 2024-01-18 PROCEDURE — 3044F HG A1C LEVEL LT 7.0%: CPT | Mod: ,,, | Performed by: FAMILY MEDICINE

## 2024-01-18 PROCEDURE — 99395 PREV VISIT EST AGE 18-39: CPT | Mod: ,,, | Performed by: FAMILY MEDICINE

## 2024-01-18 PROCEDURE — 3079F DIAST BP 80-89 MM HG: CPT | Mod: ,,, | Performed by: FAMILY MEDICINE

## 2024-01-18 PROCEDURE — 1159F MED LIST DOCD IN RCRD: CPT | Mod: ,,, | Performed by: FAMILY MEDICINE

## 2024-01-18 PROCEDURE — 3008F BODY MASS INDEX DOCD: CPT | Mod: ,,, | Performed by: FAMILY MEDICINE

## 2024-01-18 RX ORDER — GENTAMICIN SULFATE 3 MG/ML
1 SOLUTION/ DROPS OPHTHALMIC 4 TIMES DAILY
Qty: 5 ML | Refills: 5 | Status: SHIPPED | OUTPATIENT
Start: 2024-01-18

## 2024-01-18 RX ORDER — ERYTHROMYCIN 5 MG/G
OINTMENT OPHTHALMIC
COMMUNITY
Start: 2023-12-21

## 2024-01-18 NOTE — PROGRESS NOTES
Subjective     Patient ID: Eliecer Collins II is a 38 y.o. male.    Chief Complaint: Healthy You and Right eye irritation    39 yo WM here for HY #1 today. Having recurrent stye's and using erythromycin ointment per Dr.Perry Ramirez. Does wear contacts and changes them monthly. ( Does not wear at night. )      Review of Systems   Constitutional:  Negative for activity change.   Eyes:  Negative for visual disturbance.   Respiratory:  Negative for shortness of breath.    Cardiovascular:  Negative for chest pain.   Gastrointestinal:  Negative for abdominal pain.   Neurological:  Negative for headaches.   Psychiatric/Behavioral:  Negative for dysphoric mood.        Tobacco Use: Low Risk  (1/18/2024)    Patient History     Smoking Tobacco Use: Never     Smokeless Tobacco Use: Never     Passive Exposure: Never     Review of patient's allergies indicates:   Allergen Reactions    Aminophyllin     Cefaclor     Codeine     Flagyl [metronidazole] Diarrhea    Pcn [penicillins]     Sulfa (sulfonamide antibiotics)      Current Outpatient Medications   Medication Instructions    acetaminophen (TYLENOL) 500 mg, Oral, Every 6 hours PRN    atorvastatin (LIPITOR) 20 mg, Oral, Daily    cetirizine (ZYRTEC) 10 mg, Oral, Daily    cholecalciferol (vitamin D3) 5,000 Units, Oral, Daily    diltiaZEM (CARDIZEM CD) 240 mg, Oral, Daily    erythromycin (ROMYCIN) ophthalmic ointment SMARTSIG:Sparingly In Eye(s) Twice Daily    furosemide (LASIX) 40 mg, Oral, Daily    gentamicin (GARAMYCIN) 0.3 % ophthalmic solution 1 drop, Both Eyes, 4 times daily    meclizine (ANTIVERT) 25 mg, Oral, 3 times daily PRN, 1/2 to one tablet 4 times a day as needed    metoprolol succinate (TOPROL-XL) 100 mg, Oral, Daily    montelukast (SINGULAIR) 10 mg, Oral, Nightly    potassium chloride SA (K-DUR,KLOR-CON) 20 MEQ tablet 20 mEq, Oral, Daily    XARELTO 20 mg, Oral, Daily     Medications Discontinued During This Encounter   Medication Reason    azithromycin (ZITHROMAX Z-MICHELLE)  250 MG tablet     methylPREDNISolone (MEDROL DOSEPACK) 4 mg tablet Patient no longer taking       Past Medical History:   Diagnosis Date    Atrial fibrillation     Encounter for long-term (current) use of other medications     Hyperglycemia     Hyperlipidemia     Renal vein bleed     Vertigo     Vitamin D deficiency      Health Maintenance Topics with due status: Not Due       Topic Last Completion Date    TETANUS VACCINE 05/30/2019    Hemoglobin A1c (Prediabetes) 01/10/2024    Lipid Panel 01/10/2024     Immunization History   Administered Date(s) Administered    COVID-19 MRNA, LN-S PF (MODERNA HALF 0.25 ML DOSE) 10/29/2021    COVID-19, MRNA, LN-S, PF (MODERNA FULL 0.5 ML DOSE) 02/05/2021, 03/05/2021    COVID-19, mRNA, LNP-S, bivalent booster, PF (Moderna Omicron)12 + YEARS 01/27/2023    Hepatitis B, Adult 06/14/1998    Influenza - Quadrivalent 09/29/2020    Influenza - Quadrivalent - MDCK 10/01/2022    Influenza - Quadrivalent - PF *Preferred* (6 months and older) 10/01/2021, 11/19/2023    Meningococcal Polysaccharide (23-Valent) (MPSV4) 06/14/2004    Pneumococcal Conjugate - 20 Valent 11/19/2023    Pneumococcal Polysaccharide - 23 Valent 01/25/2013    Tdap 05/30/2019       Objective     Body mass index is 39.33 kg/m².  Wt Readings from Last 3 Encounters:   01/18/24 131.5 kg (290 lb)   09/11/23 134.7 kg (297 lb)   07/14/23 133.4 kg (294 lb)     Ht Readings from Last 3 Encounters:   01/18/24 6' (1.829 m)   09/11/23 6' (1.829 m)   07/14/23 6' (1.829 m)     BP Readings from Last 3 Encounters:   01/18/24 119/83   09/11/23 132/89   07/14/23 133/70     Temp Readings from Last 3 Encounters:   01/18/24 98.5 °F (36.9 °C) (Oral)   09/11/23 98.8 °F (37.1 °C) (Oral)   07/14/23 98.2 °F (36.8 °C) (Oral)     Pulse Readings from Last 3 Encounters:   01/18/24 76   09/11/23 93   07/14/23 81     Resp Readings from Last 3 Encounters:   01/18/24 18   09/11/23 18   07/14/23 20     PF Readings from Last 3 Encounters:   No data found for  PF     No visits with results within 1 Week(s) from this visit.   Latest known visit with results is:   Lab Visit on 01/10/2024   Component Date Value Ref Range Status    Glucose, Fasting 01/10/2024 107 (H)  74 - 106 mg/dL Final    Triglycerides 01/10/2024 57  35 - 150 mg/dL Final      Normal:  <150 mg/dL  Borderline High: 150-199 mg/dL  High:   200-499 mg/dL  Very High:  >=500    Cholesterol 01/10/2024 123  0 - 200 mg/dL Final      <200 mg/dL:  Desirable  200-240 mg/dL: Borderline High  >240 mg/dL:  High    HDL Cholesterol 01/10/2024 32 (L)  40 - 60 mg/dL Final      <40 mg/dL: Low HDL  40-60 mg/dL: Normal  >60 mg/dL: Desirable    Cholesterol/HDL Ratio (Risk Factor) 01/10/2024 3.8   Final    Non-HDL 01/10/2024 91  mg/dL Final    LDL Calculated 01/10/2024 80  mg/dL Final    LDL/HDL 01/10/2024 2.5   Final    VLDL 01/10/2024 11  mg/dL Final    Hemoglobin A1C 01/10/2024 6.0  4.5 - 6.6 % Final      Normal:               <5.7%  Pre-Diabetic:       5.7% to 6.4%  Diabetic:             >6.4%  Diabetic Goal:     <7%    Estimated Average Glucose 01/10/2024 126  mg/dL Final     Blood pressure 119/83, pulse 76, temperature 98.5 °F (36.9 °C), temperature source Oral, resp. rate 18, height 6' (1.829 m), weight 131.5 kg (290 lb), SpO2 96 %.      Physical Exam  Constitutional:       Appearance: Normal appearance.   HENT:      Head: Normocephalic and atraumatic.      Nose: Nose normal.      Mouth/Throat:      Mouth: Mucous membranes are moist.   Eyes:      Pupils: Pupils are equal, round, and reactive to light.   Cardiovascular:      Rate and Rhythm: Normal rate. Rhythm irregular.      Pulses: Normal pulses.      Heart sounds: Normal heart sounds.   Pulmonary:      Effort: Pulmonary effort is normal.   Abdominal:      General: Abdomen is flat.   Skin:     General: Skin is warm and dry.   Neurological:      General: No focal deficit present.      Mental Status: He is alert and oriented to person, place, and time.   Psychiatric:          Mood and Affect: Mood normal.         Behavior: Behavior normal.         Thought Content: Thought content normal.         Judgment: Judgment normal.         Assessment and Plan     Problem List Items Addressed This Visit          Endocrine    Pre-diabetes (Chronic)    Relevant Orders    Urinalysis    Hemoglobin A1C    Lipid Panel    CBC Auto Differential    Comprehensive Metabolic Panel    TSH     Other Visit Diagnoses       Routine general medical examination at a health care facility    -  Primary    Screening, lipid        Relevant Orders    Lipid Panel    Screening for diabetes mellitus        Relevant Orders    Glucose, Fasting    Hemoglobin A1C    Vitamin D deficiency        Relevant Orders    Vitamin D    Mixed hyperlipidemia        Relevant Orders    Urinalysis    Hemoglobin A1C    Lipid Panel    CBC Auto Differential    Comprehensive Metabolic Panel    TSH            Plan: RTC 6 months for HY #2 with all other lab and urine.       I have reviewed the medications, allergies, and problem list.     Goal Actions:    What type of visit is the patient here for today?: Healthy You  Does the patient consent to enroll in [facility] Healthy?: Yes  Is this a Wellness Follow Up?: Yes  What is your overall wellness goal? (select at least one): Lifestyle modifications, Lose weight, Improve overall health, Understand disease process  Choose 3: Lifestyle, Exercise, Nutrition  Lifestyle Actions : BMD if applicable, Take medications as prescribed, Develop good support system  Nurtrition Actions: Avoid adding table salt, drink 8-10 glasses of water per day, Recommend weight loss  Exercise Actions: Recommend physical activity 30 minutes per day 3-5 times/week

## 2024-01-19 ENCOUNTER — PATIENT OUTREACH (OUTPATIENT)
Dept: ADMINISTRATIVE | Facility: HOSPITAL | Age: 39
End: 2024-01-19

## 2024-01-19 NOTE — PROGRESS NOTES
Population Health Chart Review & Patient Outreach Details      Further Action Needed If Patient Returns Outreach:            Updates Requested / Reviewed:     []  Care Everywhere    []     []  External Sources ([facility], [facility], [facility], etc.)    [] [facility]   [] [facility]   [] Other:    []  Care Team Updated   []  Removed  or Duplicate Orders   []  Immunization Reconciliation Completed / Queried    [] [place]   [] [place]   [] [place]   [] [place]      Health Maintenance Topics Addressed and Outreach Outcomes / Actions Taken:             Breast Cancer Screening []  Mammogram Order Placed    []  Mammogram Screening Scheduled    []  External Records Requested & Care Team Updated if Applicable    []  External Records Uploaded & Care Team Updated if Applicable    []  Pt Declined Scheduling Mammogram    []  Pt Will Schedule with External Provider / Order Routed & Care Team Updated if Applicable              Cervical Cancer Screening []  Pap Smear Scheduled in Primary Care or OBGYN    []  External Records Requested & Care Team Updated if Applicable       []  External Records Uploaded, Care Team Updated, & History Updated if Applicable    []  Patient Declined Scheduling Pap Smear    []  Patient Will Schedule with External Provider & Care Team Updated if Applicable                  Colorectal Cancer Screening []  Colonoscopy Case Request / Referral / Home Test Order Placed    []  External Records Requested & Care Team Updated if Applicable    []  External Records Uploaded, Care Team Updated, & History Updated if Applicable    []  Patient Declined Completing Colon Cancer Screening    []  Patient Will Schedule with External Provider & Care Team Updated if Applicable    []  Fit Kit Mailed (add the SmartPhrase under additional notes)    []  Reminded Patient to Complete Home Test                Diabetic Eye Exam []  Eye Exam Screening Order Placed    []  Eye Camera Scheduled or Optometry/Ophthalmology Referral  Placed    []  External Records Requested & Care Team Updated if Applicable    []  External Records Uploaded, Care Team Updated, & History Updated if Applicable    []  Patient Declined Scheduling Eye Exam    []  Patient Will Schedule with External Provider & Care Team Updated if Applicable             Blood Pressure Control []  Primary Care Follow Up Visit Scheduled     []  Remote Blood Pressure Reading Captured    []  Patient Declined Remote Reading or Scheduling Appt - Escalated to PCP    []  Patient Will Call Back or Send Portal Message with Reading                 HbA1c & Other Labs []  Overdue Lab(s) Ordered    []  Overdue Lab(s) Scheduled    []  External Records Uploaded & Care Team Updated if Applicable    []  Primary Care Follow Up Visit Scheduled     []  Reminded Patient to Complete A1c Home Test    []  Patient Declined Scheduling Labs or Will Call Back to Schedule    []  Patient Will Schedule with External Provider / Order Routed, & Care Team Updated if Applicable           Primary Care Appointment []  Primary Care Appt Scheduled    []  Patient Declined Scheduling or Will Call Back to Schedule    []  Pt Established with External Provider, Updated Care Team, & Informed Pt to Notify Payor if Applicable           Medication Adherence /    Statin Use []  Primary Care Appointment Scheduled    []  Patient Reminded to  Prescription    []  Patient Declined, Provider Notified if Needed    []  Sent Provider Message to Review to Evaluate Pt for Statin, Add Exclusion Dx Codes, Document   Exclusion in Problem List, Change Statin Intensity Level to Moderate or High Intensity if Applicable                Osteoporosis Screening []  Dexa Order Placed    []  Dexa Appointment Scheduled    []  External Records Requested & Care Team Updated    []  External Records Uploaded, Care Team Updated, & History Updated if Applicable    []  Patient Declined Scheduling Dexa or Will Call Back to Schedule    []  Patient Will Schedule  with External Provider / Order Routed & Care Team Updated if Applicable       Additional Notes:.  Post visit Population Health review of encounter with date of service  1/18/24 with .  All required HY components in encounter.    Followup appt for: 7/18/24 HY

## 2024-05-02 DIAGNOSIS — E87.6 HYPOKALEMIA: ICD-10-CM

## 2024-05-02 DIAGNOSIS — I48.11 LONGSTANDING PERSISTENT ATRIAL FIBRILLATION: ICD-10-CM

## 2024-05-02 DIAGNOSIS — E78.5 HYPERLIPIDEMIA, UNSPECIFIED HYPERLIPIDEMIA TYPE: ICD-10-CM

## 2024-05-02 DIAGNOSIS — J30.89 NON-SEASONAL ALLERGIC RHINITIS, UNSPECIFIED TRIGGER: ICD-10-CM

## 2024-05-02 RX ORDER — METOPROLOL SUCCINATE 100 MG/1
100 TABLET, EXTENDED RELEASE ORAL DAILY
Qty: 90 TABLET | Refills: 3 | Status: SHIPPED | OUTPATIENT
Start: 2024-05-02 | End: 2025-05-02

## 2024-05-02 RX ORDER — POTASSIUM CHLORIDE 20 MEQ/1
20 TABLET, EXTENDED RELEASE ORAL DAILY
Qty: 90 TABLET | Refills: 3 | Status: SHIPPED | OUTPATIENT
Start: 2024-05-02

## 2024-05-02 RX ORDER — RIVAROXABAN 20 MG/1
20 TABLET, FILM COATED ORAL DAILY
Qty: 90 TABLET | Refills: 3 | Status: SHIPPED | OUTPATIENT
Start: 2024-05-02

## 2024-05-02 RX ORDER — DILTIAZEM HYDROCHLORIDE 240 MG/1
240 CAPSULE, COATED, EXTENDED RELEASE ORAL DAILY
Qty: 90 CAPSULE | Refills: 3 | Status: SHIPPED | OUTPATIENT
Start: 2024-05-02 | End: 2025-05-02

## 2024-05-02 RX ORDER — MONTELUKAST SODIUM 10 MG/1
10 TABLET ORAL NIGHTLY
Qty: 90 TABLET | Refills: 3 | Status: SHIPPED | OUTPATIENT
Start: 2024-05-02

## 2024-05-02 RX ORDER — FUROSEMIDE 40 MG/1
40 TABLET ORAL DAILY
Qty: 90 TABLET | Refills: 3 | Status: SHIPPED | OUTPATIENT
Start: 2024-05-02 | End: 2025-05-02

## 2024-05-02 RX ORDER — ATORVASTATIN CALCIUM 20 MG/1
20 TABLET, FILM COATED ORAL DAILY
Qty: 90 TABLET | Refills: 3 | Status: SHIPPED | OUTPATIENT
Start: 2024-05-02 | End: 2025-05-02

## 2024-05-22 RX ORDER — MECLIZINE HYDROCHLORIDE 25 MG/1
25 TABLET ORAL 3 TIMES DAILY PRN
Qty: 360 TABLET | Refills: 3 | Status: SHIPPED | OUTPATIENT
Start: 2024-05-22 | End: 2024-05-23

## 2024-05-22 RX ORDER — MECLIZINE HYDROCHLORIDE 25 MG/1
25 TABLET ORAL 3 TIMES DAILY PRN
Qty: 360 TABLET | Refills: 3 | Status: CANCELLED | OUTPATIENT
Start: 2024-05-22

## 2024-05-23 RX ORDER — MECLIZINE HYDROCHLORIDE 25 MG/1
25 TABLET ORAL 4 TIMES DAILY PRN
Qty: 360 TABLET | Refills: 3 | Status: SHIPPED | OUTPATIENT
Start: 2024-05-23 | End: 2024-05-23 | Stop reason: SDUPTHER

## 2024-05-23 RX ORDER — MECLIZINE HYDROCHLORIDE 25 MG/1
25 TABLET ORAL 4 TIMES DAILY PRN
Qty: 360 TABLET | Refills: 3 | Status: SHIPPED | OUTPATIENT
Start: 2024-05-23 | End: 2024-05-29 | Stop reason: SDUPTHER

## 2024-05-29 RX ORDER — MECLIZINE HYDROCHLORIDE 25 MG/1
25 TABLET ORAL 4 TIMES DAILY PRN
Qty: 360 TABLET | Refills: 3 | Status: SHIPPED | OUTPATIENT
Start: 2024-05-29

## 2024-05-29 RX ORDER — MECLIZINE HYDROCHLORIDE 25 MG/1
25 TABLET ORAL 4 TIMES DAILY PRN
Qty: 360 TABLET | Refills: 3 | Status: CANCELLED | OUTPATIENT
Start: 2024-05-29

## 2024-07-18 ENCOUNTER — OFFICE VISIT (OUTPATIENT)
Dept: FAMILY MEDICINE | Facility: CLINIC | Age: 39
End: 2024-07-18
Payer: COMMERCIAL

## 2024-07-18 VITALS
SYSTOLIC BLOOD PRESSURE: 111 MMHG | HEART RATE: 77 BPM | RESPIRATION RATE: 18 BRPM | HEIGHT: 72 IN | TEMPERATURE: 99 F | BODY MASS INDEX: 38.69 KG/M2 | DIASTOLIC BLOOD PRESSURE: 77 MMHG | OXYGEN SATURATION: 97 % | WEIGHT: 285.63 LBS

## 2024-07-18 DIAGNOSIS — Z00.00 ROUTINE GENERAL MEDICAL EXAMINATION AT A HEALTH CARE FACILITY: Primary | ICD-10-CM

## 2024-07-18 PROCEDURE — 3044F HG A1C LEVEL LT 7.0%: CPT | Mod: ,,, | Performed by: FAMILY MEDICINE

## 2024-07-18 PROCEDURE — 3008F BODY MASS INDEX DOCD: CPT | Mod: ,,, | Performed by: FAMILY MEDICINE

## 2024-07-18 PROCEDURE — 99396 PREV VISIT EST AGE 40-64: CPT | Mod: ,,, | Performed by: FAMILY MEDICINE

## 2024-07-18 PROCEDURE — 1159F MED LIST DOCD IN RCRD: CPT | Mod: ,,, | Performed by: FAMILY MEDICINE

## 2024-07-18 PROCEDURE — 3078F DIAST BP <80 MM HG: CPT | Mod: ,,, | Performed by: FAMILY MEDICINE

## 2024-07-18 PROCEDURE — 3074F SYST BP LT 130 MM HG: CPT | Mod: ,,, | Performed by: FAMILY MEDICINE

## 2024-07-19 ENCOUNTER — PATIENT OUTREACH (OUTPATIENT)
Facility: HOSPITAL | Age: 39
End: 2024-07-19
Payer: COMMERCIAL

## 2024-07-19 NOTE — PROGRESS NOTES
Population Health Chart Review & Patient Outreach Details  Post visit Population Health review of encounter with date of service  24 with .  All required HY components in encounter.        Further Action Needed If Patient Returns Outreach:            Updates Requested / Reviewed:     []  Care Everywhere    []     []  External Sources ([facility], [facility], [facility], etc.)    [] [facility]   [] [facility]   [] Other:    []  Care Team Updated   []  Removed  or Duplicate Orders   []  Immunization Reconciliation Completed / Queried    [] [place]   [] [place]   [] [place]   [] [place]      Health Maintenance Topics Addressed and Outreach Outcomes / Actions Taken:             Breast Cancer Screening []  Mammogram Order Placed    []  Mammogram Screening Scheduled    []  External Records Requested & Care Team Updated if Applicable    []  External Records Uploaded & Care Team Updated if Applicable    []  Pt Declined Scheduling Mammogram    []  Pt Will Schedule with External Provider / Order Routed & Care Team Updated if Applicable              Cervical Cancer Screening []  Pap Smear Scheduled in Primary Care or OBGYN    []  External Records Requested & Care Team Updated if Applicable       []  External Records Uploaded, Care Team Updated, & History Updated if Applicable    []  Patient Declined Scheduling Pap Smear    []  Patient Will Schedule with External Provider & Care Team Updated if Applicable                  Colorectal Cancer Screening []  Colonoscopy Case Request / Referral / Home Test Order Placed    []  External Records Requested & Care Team Updated if Applicable    []  External Records Uploaded, Care Team Updated, & History Updated if Applicable    []  Patient Declined Completing Colon Cancer Screening    []  Patient Will Schedule with External Provider & Care Team Updated if Applicable    []  Fit Kit Mailed (add the SmartPhrase under additional notes)    []  Reminded Patient to Complete Home Test                 Diabetic Eye Exam []  Eye Exam Screening Order Placed    []  Eye Camera Scheduled or Optometry/Ophthalmology Referral Placed    []  External Records Requested & Care Team Updated if Applicable    []  External Records Uploaded, Care Team Updated, & History Updated if Applicable    []  Patient Declined Scheduling Eye Exam    []  Patient Will Schedule with External Provider & Care Team Updated if Applicable             Blood Pressure Control []  Primary Care Follow Up Visit Scheduled     []  Remote Blood Pressure Reading Captured    []  Patient Declined Remote Reading or Scheduling Appt - Escalated to PCP    []  Patient Will Call Back or Send Portal Message with Reading                 HbA1c & Other Labs []  Overdue Lab(s) Ordered    []  Overdue Lab(s) Scheduled    []  External Records Uploaded & Care Team Updated if Applicable    []  Primary Care Follow Up Visit Scheduled     []  Reminded Patient to Complete A1c Home Test    []  Patient Declined Scheduling Labs or Will Call Back to Schedule    []  Patient Will Schedule with External Provider / Order Routed, & Care Team Updated if Applicable           Primary Care Appointment []  Primary Care Appt Scheduled    []  Patient Declined Scheduling or Will Call Back to Schedule    []  Pt Established with External Provider, Updated Care Team, & Informed Pt to Notify Payor if Applicable           Medication Adherence /    Statin Use []  Primary Care Appointment Scheduled    []  Patient Reminded to  Prescription    []  Patient Declined, Provider Notified if Needed    []  Sent Provider Message to Review to Evaluate Pt for Statin, Add Exclusion Dx Codes, Document   Exclusion in Problem List, Change Statin Intensity Level to Moderate or High Intensity if Applicable                Osteoporosis Screening []  Dexa Order Placed    []  Dexa Appointment Scheduled    []  External Records Requested & Care Team Updated    []  External Records Uploaded, Care Team  Updated, & History Updated if Applicable    []  Patient Declined Scheduling Dexa or Will Call Back to Schedule    []  Patient Will Schedule with External Provider / Order Routed & Care Team Updated if Applicable       Additional Notes:

## 2024-07-28 NOTE — PROGRESS NOTES
Subjective     Patient ID: Eliecer Collins II is a 39 y.o. male.    Chief Complaint: Healthy The You    38 yo WM here for HY #2.      Review of Systems   Constitutional:  Negative for activity change.   Eyes:  Negative for visual disturbance.   Respiratory:  Negative for shortness of breath.    Cardiovascular:  Negative for chest pain.   Gastrointestinal:  Negative for abdominal pain.   Neurological:  Negative for headaches.   Psychiatric/Behavioral:  Negative for dysphoric mood.        Tobacco Use: Low Risk  (7/18/2024)    Patient History     Smoking Tobacco Use: Never     Smokeless Tobacco Use: Never     Passive Exposure: Never     Review of patient's allergies indicates:   Allergen Reactions    Aminophyllin     Cefaclor     Codeine     Flagyl [metronidazole] Diarrhea    Pcn [penicillins]     Sulfa (sulfonamide antibiotics)      Current Outpatient Medications   Medication Instructions    acetaminophen (TYLENOL) 500 mg, Oral, Every 6 hours PRN    atorvastatin (LIPITOR) 20 mg, Oral, Daily    cetirizine (ZYRTEC) 10 mg, Oral, Daily    cholecalciferol (vitamin D3) 5,000 Units, Oral, Daily    diltiaZEM (CARDIZEM CD) 240 mg, Oral, Daily    erythromycin (ROMYCIN) ophthalmic ointment SMARTSIG:Sparingly In Eye(s) Twice Daily    furosemide (LASIX) 40 mg, Oral, Daily    gentamicin (GARAMYCIN) 0.3 % ophthalmic solution 1 drop, Both Eyes, 4 times daily    meclizine (ANTIVERT) 25 mg, Oral, 4 times daily PRN    metoprolol succinate (TOPROL-XL) 100 mg, Oral, Daily    montelukast (SINGULAIR) 10 mg, Oral, Nightly    potassium chloride SA (K-DUR,KLOR-CON) 20 MEQ tablet 20 mEq, Oral, Daily    XARELTO 20 mg, Oral, Daily     There are no discontinued medications.    Past Medical History:   Diagnosis Date    Atrial fibrillation     Encounter for long-term (current) use of other medications     Hyperglycemia     Hyperlipidemia     Renal vein bleed     Vertigo     Vitamin D deficiency      Health Maintenance Topics with due status: Not Due        Topic Last Completion Date    TETANUS VACCINE 05/30/2019    Influenza Vaccine 01/18/2024    Hemoglobin A1c (Prediabetes) 07/11/2024    Lipid Panel 07/11/2024     Immunization History   Administered Date(s) Administered    COVID-19 MRNA, LN-S PF (MODERNA HALF 0.25 ML DOSE) 10/29/2021    COVID-19, MRNA, LN-S, PF (MODERNA FULL 0.5 ML DOSE) 02/05/2021, 03/05/2021    COVID-19, mRNA, LNP-S, bivalent booster, PF (Moderna Omicron)12 + YEARS 01/27/2023    Hepatitis B, Adult 06/14/1998    Influenza - Quadrivalent 09/29/2020    Influenza - Quadrivalent - MDCK 10/01/2022    Influenza - Quadrivalent - PF *Preferred* (6 months and older) 10/01/2021, 11/19/2023    Meningococcal Polysaccharide (23-Valent) (MPSV4) 06/14/2004    Pneumococcal Conjugate - 20 Valent 11/19/2023    Pneumococcal Polysaccharide - 23 Valent 01/25/2013    Tdap 05/30/2019     No visits with results within 1 Week(s) from this visit.   Latest known visit with results is:   Lab Visit on 07/11/2024   Component Date Value Ref Range Status    Color, UA 07/11/2024 Light-Yellow  Colorless, Straw, Yellow, Light Yellow, Dark Yellow Final    Clarity, UA 07/11/2024 Clear  Clear Final    pH, UA 07/11/2024 5.5  5.0 to 8.0 pH Units Final    Leukocytes, UA 07/11/2024 Negative  Negative Final    Nitrites, UA 07/11/2024 Negative  Negative Final    Protein, UA 07/11/2024 Negative  Negative Final    Glucose, UA 07/11/2024 Normal  Normal mg/dL Final    Ketones, UA 07/11/2024 Negative  Negative mg/dL Final    Urobilinogen, UA 07/11/2024 Normal  0.2, 1.0, Normal mg/dL Final    Bilirubin, UA 07/11/2024 Negative  Negative Final    Blood, UA 07/11/2024 Negative  Negative Final    Specific Gravity, UA 07/11/2024 1.009  <=1.030 Final    Hemoglobin A1C 07/11/2024 5.9  4.5 - 6.6 % Final      Normal:               <5.7%  Pre-Diabetic:       5.7% to 6.4%  Diabetic:             >6.4%  Diabetic Goal:     <7%    Estimated Average Glucose 07/11/2024 123  mg/dL Final    Triglycerides  07/11/2024 56  35 - 150 mg/dL Final      Normal:  <150 mg/dL  Borderline High: 150-199 mg/dL  High:   200-499 mg/dL  Very High:  >=500    Cholesterol 07/11/2024 105  0 - 200 mg/dL Final      <200 mg/dL:  Desirable  200-240 mg/dL: Borderline High  >240 mg/dL:  High    HDL Cholesterol 07/11/2024 29 (L)  40 - 60 mg/dL Final      <40 mg/dL: Low HDL  40-60 mg/dL: Normal  >60 mg/dL: Desirable    Cholesterol/HDL Ratio (Risk Factor) 07/11/2024 3.6   Final    Non-HDL 07/11/2024 76  mg/dL Final    LDL Calculated 07/11/2024 65  mg/dL Final    Unable to calculate due to one of the following values:  Cholesterol <5  HDL Cholesterol <5  Triglycerides <10 or >400    LDL/HDL 07/11/2024 2.2   Final    Unable to calculate due to one of the following values:  Cholesterol <5  HDL Cholesterol <5  Triglycerides <10 or >400    VLDL 07/11/2024 11  mg/dL Final    Sodium 07/11/2024 138  136 - 145 mmol/L Final    Potassium 07/11/2024 4.5  3.5 - 5.1 mmol/L Final    Chloride 07/11/2024 104  98 - 107 mmol/L Final    CO2 07/11/2024 30  21 - 32 mmol/L Final    Anion Gap 07/11/2024 9  7 - 16 mmol/L Final    Glucose 07/11/2024 114 (H)  74 - 106 mg/dL Final    BUN 07/11/2024 10  7 - 18 mg/dL Final    Creatinine 07/11/2024 0.89  0.70 - 1.30 mg/dL Final    BUN/Creatinine Ratio 07/11/2024 11  6 - 20 Final    Calcium 07/11/2024 8.6  8.5 - 10.1 mg/dL Final    Total Protein 07/11/2024 8.1  6.4 - 8.2 g/dL Final    Albumin 07/11/2024 4.1  3.5 - 5.0 g/dL Final    Globulin 07/11/2024 4.0  2.0 - 4.0 g/dL Final    A/G Ratio 07/11/2024 1.0   Final    Bilirubin, Total 07/11/2024 2.0 (H)  >0.0 - 1.2 mg/dL Final    Alk Phos 07/11/2024 96  45 - 115 U/L Final    ALT 07/11/2024 40  16 - 61 U/L Final    AST 07/11/2024 23  15 - 37 U/L Final    eGFR 07/11/2024 111  >=60 mL/min/1.73m2 Final    TSH 07/11/2024 1.550  0.358 - 3.740 uIU/mL Final    Vitamin D 25-Hydroxy, Blood 07/11/2024 50.5  ng/mL Final    Vitamin D 25-OH Adult Reference Values:  Deficiency: <20  ng/mL  Insufficiency: 20 - <30 ng/mL  Sufficiency: 30 -100 ng/mL    Vitamin D 25-OH Pediatric Reference Values:  Deficiency: <15 ng/mL  Insufficiency: 15 - <20 ng/mL  Sufficiency: 20 - 100 ng/mL    WBC 07/11/2024 6.63  4.50 - 11.00 K/uL Final    RBC 07/11/2024 5.74  4.60 - 6.20 M/uL Final    Hemoglobin 07/11/2024 15.6  13.5 - 18.0 g/dL Final    Hematocrit 07/11/2024 49.9  40.0 - 54.0 % Final    MCV 07/11/2024 86.9  80.0 - 96.0 fL Final    MCH 07/11/2024 27.2  27.0 - 31.0 pg Final    MCHC 07/11/2024 31.3 (L)  32.0 - 36.0 g/dL Final    RDW 07/11/2024 13.3  11.5 - 14.5 % Final    Platelet Count 07/11/2024 301  150 - 400 K/uL Final    MPV 07/11/2024 11.5  9.4 - 12.4 fL Final    Neutrophils % 07/11/2024 62.5  53.0 - 65.0 % Final    Lymphocytes % 07/11/2024 24.1 (L)  27.0 - 41.0 % Final    Monocytes % 07/11/2024 9.5 (H)  2.0 - 6.0 % Final    Eosinophils % 07/11/2024 2.7  1.0 - 4.0 % Final    Basophils % 07/11/2024 0.9  0.0 - 1.0 % Final    Immature Granulocytes % 07/11/2024 0.3  0.0 - 0.4 % Final    nRBC, Auto 07/11/2024 0.0  <=0.0 % Final    Neutrophils, Abs 07/11/2024 4.14  1.80 - 7.70 K/uL Final    Lymphocytes, Absolute 07/11/2024 1.60  1.00 - 4.80 K/uL Final    Monocytes, Absolute 07/11/2024 0.63  0.00 - 0.80 K/uL Final    Eosinophils, Absolute 07/11/2024 0.18  0.00 - 0.50 K/uL Final    Basophils, Absolute 07/11/2024 0.06  0.00 - 0.20 K/uL Final    Immature Granulocytes, Absolute 07/11/2024 0.02  0.00 - 0.04 K/uL Final    nRBC, Absolute 07/11/2024 0.00  <=0.00 x10e3/uL Final    Diff Type 07/11/2024 Auto   Final       Objective   Blood pressure 111/77, pulse 77, temperature 98.6 °F (37 °C), temperature source Oral, resp. rate 18, height 6' (1.829 m), weight 129.5 kg (285 lb 9.6 oz), SpO2 97%.    Body mass index is 38.73 kg/m².  Wt Readings from Last 3 Encounters:   07/18/24 129.5 kg (285 lb 9.6 oz)   01/18/24 131.5 kg (290 lb)   09/11/23 134.7 kg (297 lb)     Ht Readings from Last 3 Encounters:   07/18/24 6' (1.829 m)    01/18/24 6' (1.829 m)   09/11/23 6' (1.829 m)     BP Readings from Last 3 Encounters:   07/18/24 111/77   01/18/24 119/83   09/11/23 132/89     Temp Readings from Last 3 Encounters:   07/18/24 98.6 °F (37 °C) (Oral)   01/18/24 98.5 °F (36.9 °C) (Oral)   09/11/23 98.8 °F (37.1 °C) (Oral)     Pulse Readings from Last 3 Encounters:   07/18/24 77   01/18/24 76   09/11/23 93     Resp Readings from Last 3 Encounters:   07/18/24 18   01/18/24 18   09/11/23 18     PF Readings from Last 3 Encounters:   No data found for PF       Physical Exam  Constitutional:       Appearance: Normal appearance.   HENT:      Head: Normocephalic and atraumatic.      Nose: Nose normal.      Mouth/Throat:      Mouth: Mucous membranes are moist.   Eyes:      Pupils: Pupils are equal, round, and reactive to light.   Cardiovascular:      Rate and Rhythm: Normal rate and regular rhythm.      Pulses: Normal pulses.      Heart sounds: Normal heart sounds.   Pulmonary:      Effort: Pulmonary effort is normal.      Breath sounds: Normal breath sounds.   Abdominal:      General: Abdomen is flat.      Palpations: Abdomen is soft.   Skin:     General: Skin is warm and dry.   Neurological:      General: No focal deficit present.      Mental Status: He is alert and oriented to person, place, and time.   Psychiatric:         Mood and Affect: Mood normal.         Behavior: Behavior normal.         Thought Content: Thought content normal.         Judgment: Judgment normal.         Assessment and Plan     Problem List Items Addressed This Visit    None  Visit Diagnoses       Routine general medical examination at a health care facility    -  Primary            Plan: RTC 6 months for HY #1 and lab.       I have reviewed the medications, allergies, and problem list.     Goal Actions:    What type of visit is the patient here for today?: Healthy You  Does the patient consent to enroll in Color Me Healthy?: Yes  Is this a Wellness Follow Up?: No  What is your  overall wellness goal? (select at least one): Improve overall health  Choose 3: Lifestyle, Exercise, Nutrition  Lifestyle Actions : Schedule colonoscopy, sigmoidoscopy, or Colorguard if applicable  Nurtrition Actions: drink 8-10 glasses of water per day  Exercise Actions: Recommend physical activity 30 minutes per day 3-5 times/week

## 2024-10-15 ENCOUNTER — OFFICE VISIT (OUTPATIENT)
Dept: FAMILY MEDICINE | Facility: CLINIC | Age: 39
End: 2024-10-15
Payer: COMMERCIAL

## 2024-10-15 VITALS
WEIGHT: 293 LBS | TEMPERATURE: 98 F | BODY MASS INDEX: 39.68 KG/M2 | RESPIRATION RATE: 20 BRPM | SYSTOLIC BLOOD PRESSURE: 116 MMHG | OXYGEN SATURATION: 99 % | DIASTOLIC BLOOD PRESSURE: 74 MMHG | HEIGHT: 72 IN

## 2024-10-15 DIAGNOSIS — R20.2 NUMBNESS AND TINGLING IN RIGHT HAND: ICD-10-CM

## 2024-10-15 DIAGNOSIS — M54.2 PAIN IN NECK: Primary | ICD-10-CM

## 2024-10-15 DIAGNOSIS — R20.0 NUMBNESS AND TINGLING IN RIGHT HAND: ICD-10-CM

## 2024-10-15 PROCEDURE — 3078F DIAST BP <80 MM HG: CPT | Mod: ,,, | Performed by: NURSE PRACTITIONER

## 2024-10-15 PROCEDURE — 1160F RVW MEDS BY RX/DR IN RCRD: CPT | Mod: ,,, | Performed by: NURSE PRACTITIONER

## 2024-10-15 PROCEDURE — 1159F MED LIST DOCD IN RCRD: CPT | Mod: ,,, | Performed by: NURSE PRACTITIONER

## 2024-10-15 PROCEDURE — 99213 OFFICE O/P EST LOW 20 MIN: CPT | Mod: 25,,, | Performed by: NURSE PRACTITIONER

## 2024-10-15 PROCEDURE — 96372 THER/PROPH/DIAG INJ SC/IM: CPT | Mod: ,,, | Performed by: NURSE PRACTITIONER

## 2024-10-15 PROCEDURE — 3074F SYST BP LT 130 MM HG: CPT | Mod: ,,, | Performed by: NURSE PRACTITIONER

## 2024-10-15 PROCEDURE — 3044F HG A1C LEVEL LT 7.0%: CPT | Mod: ,,, | Performed by: NURSE PRACTITIONER

## 2024-10-15 PROCEDURE — 3008F BODY MASS INDEX DOCD: CPT | Mod: ,,, | Performed by: NURSE PRACTITIONER

## 2024-10-15 RX ORDER — DEXAMETHASONE SODIUM PHOSPHATE 4 MG/ML
8 INJECTION, SOLUTION INTRA-ARTICULAR; INTRALESIONAL; INTRAMUSCULAR; INTRAVENOUS; SOFT TISSUE
Status: COMPLETED | OUTPATIENT
Start: 2024-10-15 | End: 2024-10-15

## 2024-10-15 RX ORDER — KETOROLAC TROMETHAMINE 30 MG/ML
60 INJECTION, SOLUTION INTRAMUSCULAR; INTRAVENOUS
Status: COMPLETED | OUTPATIENT
Start: 2024-10-15 | End: 2024-10-15

## 2024-10-15 RX ADMIN — DEXAMETHASONE SODIUM PHOSPHATE 8 MG: 4 INJECTION, SOLUTION INTRA-ARTICULAR; INTRALESIONAL; INTRAMUSCULAR; INTRAVENOUS; SOFT TISSUE at 01:10

## 2024-10-15 RX ADMIN — KETOROLAC TROMETHAMINE 60 MG: 30 INJECTION, SOLUTION INTRAMUSCULAR; INTRAVENOUS at 01:10

## 2024-10-15 NOTE — PROGRESS NOTES
"   Seda Bazzi NP   [address] [place] [address]     PATIENT NAME: Eliecer Collins II  : 1985  DATE: 10/15/24  MRN: [id number]      Billing Provider: Seda Bazzi NP  Level of Service:   Patient PCP Information       Provider PCP Type    Darlene Ramirez MD General            Reason for Visit / Chief Complaint: Neck Pain (Started two weeks ago admits it started like a crick in his neck. Now it has moved down his arm on the right side states it send throbbing sensation down his arm )       Update PCP  Update Chief Complaint         History of Present Illness / Problem Focused Workflow     Eliecer Collins II presents to the clinic with Neck Pain (Started two weeks ago admits it started like a crick in his neck. Now it has moved down his arm on the right side states it send throbbing sensation down his arm )     Neck Pain   Associated symptoms include numbness. Pertinent negatives include no chest pain, headaches, trouble swallowing or weakness.     Patient presents to clinic today with c/o right neck and arm pain. Described as "throbbing". Started x 2 weeks ago with what he thought was a crick in his neck. Since then, has radiated to arm and causes numbness and tingling to right index finger and thumb. He is not taking any OTC medications. Denies any known injury.    Review of Systems     Review of Systems   Constitutional:  Negative for appetite change, chills, fatigue and unexpected weight change.   HENT:  Negative for dental problem, facial swelling, hearing loss, trouble swallowing and voice change.    Eyes:  Negative for visual disturbance.   Respiratory:  Negative for apnea, chest tightness and shortness of breath.    Cardiovascular:  Negative for chest pain, palpitations and leg swelling.   Gastrointestinal:  Negative for abdominal pain, blood in stool, change in bowel habit and reflux.   Endocrine: Negative for cold intolerance and heat intolerance.   Genitourinary:  Negative for decreased urine " volume, difficulty urinating, hematuria, scrotal swelling and testicular pain.   Musculoskeletal:  Positive for neck pain. Negative for gait problem, joint swelling and myalgias.        Pain in right arm   Integumentary:  Negative for color change and pallor.   Neurological:  Positive for numbness. Negative for weakness, light-headedness and headaches.   Psychiatric/Behavioral:  Negative for sleep disturbance. The patient is not nervous/anxious.         Medical / Social / Family History     Past Medical History:   Diagnosis Date    Atrial fibrillation     Encounter for long-term (current) use of other medications     Hyperglycemia     Hyperlipidemia     Renal vein bleed     Vertigo     Vitamin D deficiency        Past Surgical History:   Procedure Laterality Date    CARDIAC PACEMAKER PLACEMENT  2010    [employer]    FUSION OF SPINE WITH INSTRUMENTATION      pancho rods for scoliosis     left leg vein  2019    Dr. Sanchez    rectal abscess  2008    x2 Dr. Moreno    right leg vein surgery  2020    Dr. Sanchez    TONSILLECTOMY         Social History    reports that he has never smoked. He has never been exposed to tobacco smoke. He has never used smokeless tobacco. He reports that he does not drink alcohol and does not use drugs.    Family History  's family history includes COPD in his paternal grandmother and paternal uncle; Heart disease in his maternal grandfather.    Medications and Allergies     Medications  No outpatient medications have been marked as taking for the 10/15/24 encounter (Office Visit) with Seda Bazzi NP.     Current Facility-Administered Medications for the 10/15/24 encounter (Office Visit) with Seda Bazzi NP   Medication Dose Route Frequency Provider Last Rate Last Admin    dexAMETHasone injection 8 mg  8 mg Intramuscular 1 time in Clinic/HOD Seda Bazzi NP        ketorolac injection 60 mg  60 mg Intramuscular 1 time in Clinic/HOD Seda Bazzi NP      "      Allergies  Review of patient's allergies indicates:   Allergen Reactions    Aminophyllin     Cefaclor     Codeine     Flagyl [metronidazole] Diarrhea    Pcn [penicillins]     Sulfa (sulfonamide antibiotics)        Physical Examination   /74   Temp 97.8 °F (36.6 °C)   Resp 20   Ht 6' 0.01" (1.829 m)   Wt 132.9 kg (293 lb)   SpO2 99%   BMI 39.73 kg/m²    Physical Exam  Vitals and nursing note reviewed.   Constitutional:       Appearance: Normal appearance.   HENT:      Head: Normocephalic and atraumatic.      Nose: Nose normal.      Mouth/Throat:      Mouth: Mucous membranes are moist.      Pharynx: Oropharynx is clear.   Eyes:      Extraocular Movements: Extraocular movements intact.      Conjunctiva/sclera: Conjunctivae normal.      Pupils: Pupils are equal, round, and reactive to light.   Cardiovascular:      Rate and Rhythm: Normal rate and regular rhythm.      Pulses: Normal pulses.      Heart sounds: Normal heart sounds.   Pulmonary:      Effort: Pulmonary effort is normal.      Breath sounds: Normal breath sounds.   Abdominal:      General: Abdomen is flat. Bowel sounds are normal.      Palpations: Abdomen is soft.   Musculoskeletal:         General: Normal range of motion.      Cervical back: Normal range of motion and neck supple.      Comments: Full active ROM to neck and right arm without pain. Denies any tenderness. No visible abnormality.   Skin:     General: Skin is warm and dry.      Capillary Refill: Capillary refill takes less than 2 seconds.   Neurological:      General: No focal deficit present.      Mental Status: He is alert and oriented to person, place, and time.   Psychiatric:         Behavior: Behavior normal.         Thought Content: Thought content normal.          Assessment and Plan (including Health Maintenance)      Problem List  Smart Sets  Document Outside HM   :    Plan:   Anti-inflammatory inj. Given in clinic.  Ice as needed.  RTC if s/s worsen or persist. Will " order imaging if symptoms persist.      Health Maintenance Due   Topic Date Due    Hepatitis C Screening  Never done    HIV Screening  Never done    Influenza Vaccine (1) 09/01/2024    COVID-19 Vaccine (5 - 2024-25 season) 09/01/2024       Problem List Items Addressed This Visit    None  Visit Diagnoses       Pain in neck    -  Primary    Relevant Medications    ketorolac injection 60 mg (Start on 10/15/2024 10:45 AM)    dexAMETHasone injection 8 mg (Start on 10/15/2024 10:45 AM)    Numbness and tingling in right hand        Relevant Medications    ketorolac injection 60 mg (Start on 10/15/2024 10:45 AM)    dexAMETHasone injection 8 mg (Start on 10/15/2024 10:45 AM)            Health Maintenance Topics with due status: Not Due       Topic Last Completion Date    TETANUS VACCINE 05/30/2019    Hemoglobin A1c (Prediabetes) 07/11/2024    Lipid Panel 07/11/2024    RSV Vaccine (Age 60+ and Pregnant patients) Not Due       Future Appointments   Date Time Provider Department Center   1/16/2025  8:00 AM Darlene Ramirez MD [facility]            Signature:  Seda Bazzi NP      [address]   [place] [address]    Date of encounter: 10/15/24

## 2024-10-24 DIAGNOSIS — M54.2 PAIN IN NECK: ICD-10-CM

## 2024-10-24 DIAGNOSIS — R20.2 NUMBNESS AND TINGLING IN RIGHT HAND: ICD-10-CM

## 2024-10-24 DIAGNOSIS — R20.0 NUMBNESS AND TINGLING IN RIGHT HAND: ICD-10-CM

## 2024-10-24 DIAGNOSIS — R20.0 NUMBNESS AND TINGLING IN RIGHT HAND: Primary | ICD-10-CM

## 2024-10-24 DIAGNOSIS — R20.2 NUMBNESS AND TINGLING IN RIGHT HAND: Primary | ICD-10-CM

## 2024-10-24 DIAGNOSIS — M54.2 PAIN IN NECK: Primary | ICD-10-CM

## 2024-10-24 RX ORDER — GABAPENTIN 100 MG/1
CAPSULE ORAL
Qty: 90 CAPSULE | Refills: 11 | Status: SHIPPED | OUTPATIENT
Start: 2024-10-24

## 2024-10-24 RX ORDER — METHYLPREDNISOLONE 4 MG/1
TABLET ORAL
Qty: 1 EACH | Refills: 1 | Status: SHIPPED | OUTPATIENT
Start: 2024-10-24 | End: 2024-10-24 | Stop reason: SDUPTHER

## 2024-10-24 RX ORDER — METHYLPREDNISOLONE 4 MG/1
TABLET ORAL
Qty: 1 EACH | Refills: 1 | Status: SHIPPED | OUTPATIENT
Start: 2024-10-24

## 2024-10-25 ENCOUNTER — HOSPITAL ENCOUNTER (OUTPATIENT)
Dept: RADIOLOGY | Facility: HOSPITAL | Age: 39
Discharge: HOME | End: 2024-10-25
Attending: FAMILY MEDICINE
Payer: COMMERCIAL

## 2024-10-25 DIAGNOSIS — R20.0 NUMBNESS AND TINGLING IN RIGHT HAND: ICD-10-CM

## 2024-10-25 DIAGNOSIS — R20.2 NUMBNESS AND TINGLING IN RIGHT HAND: ICD-10-CM

## 2024-10-25 DIAGNOSIS — M54.2 PAIN IN NECK: ICD-10-CM

## 2024-10-25 PROCEDURE — 72050 X-RAY EXAM NECK SPINE 4/5VWS: CPT | Mod: TC

## 2024-10-25 PROCEDURE — 72050 X-RAY EXAM NECK SPINE 4/5VWS: CPT | Mod: 26,,, | Performed by: RADIOLOGY

## 2024-12-16 ENCOUNTER — PATIENT MESSAGE (OUTPATIENT)
Dept: FAMILY MEDICINE | Facility: CLINIC | Age: 39
End: 2024-12-16
Payer: COMMERCIAL

## 2024-12-16 ENCOUNTER — TELEPHONE (OUTPATIENT)
Dept: FAMILY MEDICINE | Facility: CLINIC | Age: 39
End: 2024-12-16
Payer: COMMERCIAL

## 2024-12-16 DIAGNOSIS — I48.11 LONGSTANDING PERSISTENT ATRIAL FIBRILLATION: Primary | ICD-10-CM

## 2024-12-16 NOTE — TELEPHONE ENCOUNTER
Zarelto 20 mg is on  backorder - Callback at [phone] - Ref#[id number]. RX will be on hold until [facility] hears back from us.  Thank you

## 2025-01-16 ENCOUNTER — OFFICE VISIT (OUTPATIENT)
Dept: FAMILY MEDICINE | Facility: CLINIC | Age: 40
End: 2025-01-16
Payer: COMMERCIAL

## 2025-01-16 VITALS
SYSTOLIC BLOOD PRESSURE: 128 MMHG | BODY MASS INDEX: 40.32 KG/M2 | HEART RATE: 80 BPM | WEIGHT: 297.69 LBS | HEIGHT: 72 IN | OXYGEN SATURATION: 96 % | RESPIRATION RATE: 16 BRPM | DIASTOLIC BLOOD PRESSURE: 78 MMHG | TEMPERATURE: 98 F

## 2025-01-16 DIAGNOSIS — E78.2 MIXED HYPERLIPIDEMIA: ICD-10-CM

## 2025-01-16 DIAGNOSIS — E55.9 VITAMIN D DEFICIENCY: ICD-10-CM

## 2025-01-16 DIAGNOSIS — Z00.00 ROUTINE GENERAL MEDICAL EXAMINATION AT A HEALTH CARE FACILITY: Primary | ICD-10-CM

## 2025-01-16 DIAGNOSIS — E78.5 HYPERLIPIDEMIA, UNSPECIFIED HYPERLIPIDEMIA TYPE: Chronic | ICD-10-CM

## 2025-01-16 DIAGNOSIS — E11.9 TYPE 2 DIABETES MELLITUS WITHOUT COMPLICATION, WITHOUT LONG-TERM CURRENT USE OF INSULIN: Chronic | ICD-10-CM

## 2025-01-16 PROCEDURE — 3074F SYST BP LT 130 MM HG: CPT | Mod: ,,, | Performed by: FAMILY MEDICINE

## 2025-01-16 PROCEDURE — 1159F MED LIST DOCD IN RCRD: CPT | Mod: ,,, | Performed by: FAMILY MEDICINE

## 2025-01-16 PROCEDURE — 3008F BODY MASS INDEX DOCD: CPT | Mod: ,,, | Performed by: FAMILY MEDICINE

## 2025-01-16 PROCEDURE — 3078F DIAST BP <80 MM HG: CPT | Mod: ,,, | Performed by: FAMILY MEDICINE

## 2025-01-16 PROCEDURE — 3044F HG A1C LEVEL LT 7.0%: CPT | Mod: ,,, | Performed by: FAMILY MEDICINE

## 2025-01-16 PROCEDURE — 99396 PREV VISIT EST AGE 40-64: CPT | Mod: ,,, | Performed by: FAMILY MEDICINE

## 2025-01-16 RX ORDER — RIVAROXABAN 20 MG/1
20 TABLET, FILM COATED ORAL DAILY
COMMUNITY
Start: 2025-01-06

## 2025-01-16 RX ORDER — ROSUVASTATIN CALCIUM 10 MG/1
10 TABLET, COATED ORAL DAILY
Qty: 90 TABLET | Refills: 3 | Status: SHIPPED | OUTPATIENT
Start: 2025-01-16 | End: 2026-01-16

## 2025-01-16 NOTE — PROGRESS NOTES
Subjective     Patient ID: Eliecer Collins II is a 39 y.o. male.    Chief Complaint: Healthy You    40 yo WM here for HY and lab. HgA1c is up and now has diabetes. LDL is still above goal.       Review of Systems   Constitutional:  Negative for activity change.   Eyes:  Negative for visual disturbance.   Respiratory:  Negative for shortness of breath.    Cardiovascular:  Negative for chest pain.   Gastrointestinal:  Negative for abdominal pain.   Neurological:  Negative for headaches.   Psychiatric/Behavioral:  Negative for dysphoric mood.        Tobacco Use: Low Risk  (1/16/2025)    Patient History     Smoking Tobacco Use: Never     Smokeless Tobacco Use: Never     Passive Exposure: Never     Review of patient's allergies indicates:   Allergen Reactions    Pcn [penicillins] Anaphylaxis    Sulfa (sulfonamide antibiotics) Itching    Aminophyllin     Cefaclor     Flagyl [metronidazole] Diarrhea    Codeine Rash     Current Outpatient Medications   Medication Instructions    acetaminophen (TYLENOL) 500 mg, Every 6 hours PRN    cetirizine (ZYRTEC) 10 mg, Daily    cholecalciferol (vitamin D3) 5,000 Units, Daily    diltiaZEM (CARDIZEM CD) 240 mg, Oral, Daily    erythromycin (ROMYCIN) ophthalmic ointment SMARTSIG:Sparingly In Eye(s) Twice Daily    furosemide (LASIX) 40 mg, Oral, Daily    gabapentin (NEURONTIN) 100 MG capsule Start with 1 capsules at bedtime and gradually increase to 2 or 3 at bedtime.    gentamicin (GARAMYCIN) 0.3 % ophthalmic solution 1 drop, Both Eyes, 4 times daily    meclizine (ANTIVERT) 25 mg, Oral, 4 times daily PRN    metoprolol succinate (TOPROL-XL) 100 mg, Oral, Daily    montelukast (SINGULAIR) 10 mg, Oral, Nightly    potassium chloride SA (K-DUR,KLOR-CON) 20 MEQ tablet 20 mEq, Oral, Daily    rosuvastatin (CRESTOR) 10 mg, Oral, Daily    XARELTO 20 mg, Daily     Medications Discontinued During This Encounter   Medication Reason    methylPREDNISolone (MEDROL DOSEPACK) 4 mg tablet Patient no longer taking  "   apixaban (ELIQUIS) 5 mg Tab Patient no longer taking    atorvastatin (LIPITOR) 20 MG tablet Alternate therapy       Past Medical History:   Diagnosis Date    Atrial fibrillation     Encounter for long-term (current) use of other medications     Hyperglycemia     Hyperlipidemia     Renal vein bleed     Vertigo     Vitamin D deficiency      Health Maintenance Topics with due status: Not Due       Topic Last Completion Date    TETANUS VACCINE 05/30/2019    Hemoglobin A1c (Prediabetes) 01/13/2025    Lipid Panel 01/13/2025    RSV Vaccine (Age 60+ and Pregnant patients) Not Due     Immunization History   Administered Date(s) Administered    COVID-19 MRNA, LN-S PF (MODERNA HALF 0.25 ML DOSE) 10/29/2021    COVID-19, MRNA, LN-S, PF (MODERNA FULL 0.5 ML DOSE) 02/05/2021, 03/05/2021    COVID-19, mRNA, LNP-S, bivalent booster, PF (Moderna Omicron)12 + YEARS 01/27/2023    Hepatitis B, Adult 06/14/1998    Influenza - Quadrivalent 09/29/2020    Influenza - Quadrivalent - MDCK 10/01/2022    Influenza - Quadrivalent - PF *Preferred* (6 months and older) 10/01/2021, 11/19/2023    Influenza - Trivalent - Fluarix, Flulaval, Fluzone, Afluria - PF 10/17/2024    Meningococcal Polysaccharide (23-Valent) (MPSV4) 06/14/2004    Pneumococcal Conjugate - 20 Valent 11/19/2023    Pneumococcal Polysaccharide - 23 Valent 01/25/2013    Tdap 05/30/2019       Objective   Blood pressure 128/78, pulse 80, temperature 98.1 °F (36.7 °C), temperature source Oral, resp. rate 16, height 6' (1.829 m), weight 135 kg (297 lb 11.2 oz), SpO2 96%.    Body mass index is 40.38 kg/m².  Wt Readings from Last 3 Encounters:   01/16/25 135 kg (297 lb 11.2 oz)   10/15/24 132.9 kg (293 lb)   07/18/24 129.5 kg (285 lb 9.6 oz)     Ht Readings from Last 3 Encounters:   01/16/25 6' (1.829 m)   10/15/24 6' 0.01" (1.829 m)   07/18/24 6' (1.829 m)     BP Readings from Last 3 Encounters:   01/16/25 128/78   10/15/24 116/74   07/18/24 111/77     Temp Readings from Last 3 " Encounters:   01/16/25 98.1 °F (36.7 °C) (Oral)   10/15/24 97.8 °F (36.6 °C)   07/18/24 98.6 °F (37 °C) (Oral)     Pulse Readings from Last 3 Encounters:   01/16/25 80   07/18/24 77   01/18/24 76     Resp Readings from Last 3 Encounters:   01/16/25 16   10/15/24 20   07/18/24 18     PF Readings from Last 3 Encounters:   No data found for PF       Physical Exam  Constitutional:       Appearance: Normal appearance.   HENT:      Head: Normocephalic and atraumatic.      Nose: Nose normal.      Mouth/Throat:      Mouth: Mucous membranes are moist.   Eyes:      Pupils: Pupils are equal, round, and reactive to light.   Cardiovascular:      Rate and Rhythm: Normal rate and regular rhythm.      Pulses: Normal pulses.      Heart sounds: Normal heart sounds.   Pulmonary:      Effort: Pulmonary effort is normal.      Breath sounds: Normal breath sounds.   Abdominal:      General: Abdomen is flat.      Palpations: Abdomen is soft.   Skin:     General: Skin is warm and dry.   Neurological:      General: No focal deficit present.      Mental Status: He is alert and oriented to person, place, and time.   Psychiatric:         Mood and Affect: Mood normal.         Behavior: Behavior normal.         Thought Content: Thought content normal.         Judgment: Judgment normal.         Assessment and Plan     Problem List Items Addressed This Visit          Cardiac/Vascular    Hyperlipidemia (Chronic)    Relevant Medications    rosuvastatin (CRESTOR) 10 MG tablet     Other Visit Diagnoses       Routine general medical examination at a health care facility    -  Primary            Plan: RTC 6 months with all lab fasting for HY#2. And urine.       I have reviewed the medications, allergies, and problem list.   Will change to crestor to try to get to LDL goal. Discussed consideration of starting metformin and considering injectables soon to help with weight loss.   Goal Actions:    What type of visit is the patient here for today?: Healthy  You  Does the patient consent to enroll in [employer] Healthy?: Yes  Is this a Wellness Follow Up?: Yes  What is your overall wellness goal? (select at least one): Improve overall health  Choose 3: Biometric, Nutrition, Exercise  Biometric Actions: Attend regularly scheduled office visits  Nurtrition Actions: Eat a well-balanced diet  Exercise Actions: Recommend physical activity 30 minutes per day 3-5 times/week

## 2025-04-17 DIAGNOSIS — J30.89 NON-SEASONAL ALLERGIC RHINITIS, UNSPECIFIED TRIGGER: ICD-10-CM

## 2025-04-17 DIAGNOSIS — I48.11 LONGSTANDING PERSISTENT ATRIAL FIBRILLATION: ICD-10-CM

## 2025-04-17 DIAGNOSIS — E87.6 HYPOKALEMIA: ICD-10-CM

## 2025-04-21 RX ORDER — RIVAROXABAN 20 MG/1
20 TABLET, FILM COATED ORAL DAILY
Qty: 90 TABLET | Refills: 3 | Status: SHIPPED | OUTPATIENT
Start: 2025-04-21

## 2025-04-21 RX ORDER — METOPROLOL SUCCINATE 100 MG/1
100 TABLET, EXTENDED RELEASE ORAL DAILY
Qty: 90 TABLET | Refills: 3 | Status: SHIPPED | OUTPATIENT
Start: 2025-04-21 | End: 2026-04-21

## 2025-04-21 RX ORDER — POTASSIUM CHLORIDE 20 MEQ/1
20 TABLET, EXTENDED RELEASE ORAL DAILY
Qty: 90 TABLET | Refills: 3 | Status: SHIPPED | OUTPATIENT
Start: 2025-04-21

## 2025-04-21 RX ORDER — DILTIAZEM HYDROCHLORIDE 240 MG/1
240 CAPSULE, COATED, EXTENDED RELEASE ORAL DAILY
Qty: 90 CAPSULE | Refills: 3 | Status: SHIPPED | OUTPATIENT
Start: 2025-04-21 | End: 2026-04-21

## 2025-04-21 RX ORDER — MONTELUKAST SODIUM 10 MG/1
10 TABLET ORAL NIGHTLY
Qty: 90 TABLET | Refills: 3 | Status: SHIPPED | OUTPATIENT
Start: 2025-04-21

## 2025-04-21 RX ORDER — FUROSEMIDE 40 MG/1
40 TABLET ORAL DAILY
Qty: 90 TABLET | Refills: 3 | Status: SHIPPED | OUTPATIENT
Start: 2025-04-21 | End: 2026-04-21

## 2025-06-25 ENCOUNTER — CLINICAL SUPPORT (OUTPATIENT)
Dept: FAMILY MEDICINE | Facility: CLINIC | Age: 40
End: 2025-06-25
Payer: COMMERCIAL

## 2025-06-25 DIAGNOSIS — E78.2 MIXED HYPERLIPIDEMIA: ICD-10-CM

## 2025-06-25 DIAGNOSIS — E55.9 VITAMIN D DEFICIENCY: ICD-10-CM

## 2025-06-25 DIAGNOSIS — E78.5 HYPERLIPIDEMIA, UNSPECIFIED HYPERLIPIDEMIA TYPE: ICD-10-CM

## 2025-06-25 DIAGNOSIS — E11.9 TYPE 2 DIABETES MELLITUS WITHOUT COMPLICATION, WITHOUT LONG-TERM CURRENT USE OF INSULIN: ICD-10-CM

## 2025-06-25 LAB
25(OH)D3 SERPL-MCNC: 53 NG/ML (ref 30–80)
ALBUMIN SERPL BCP-MCNC: 4.4 G/DL (ref 3.5–5)
ALBUMIN/GLOB SERPL: 1 {RATIO}
ALP SERPL-CCNC: 80 U/L (ref 40–150)
ALT SERPL W P-5'-P-CCNC: 28 U/L
ANION GAP SERPL CALCULATED.3IONS-SCNC: 14 MMOL/L (ref 7–16)
AST SERPL W P-5'-P-CCNC: 31 U/L (ref 11–45)
BASOPHILS # BLD AUTO: 0.06 K/UL (ref 0–0.2)
BASOPHILS NFR BLD AUTO: 0.9 % (ref 0–1)
BILIRUB SERPL-MCNC: 2.7 MG/DL
BILIRUB UR QL STRIP: NEGATIVE
BUN SERPL-MCNC: 9 MG/DL (ref 9–21)
BUN/CREAT SERPL: 13 (ref 6–20)
CALCIUM SERPL-MCNC: 9.4 MG/DL (ref 8.4–10.2)
CHLORIDE SERPL-SCNC: 101 MMOL/L (ref 98–107)
CHOLEST SERPL-MCNC: 116 MG/DL
CHOLEST/HDLC SERPL: 4.5 {RATIO}
CLARITY UR: CLEAR
CO2 SERPL-SCNC: 28 MMOL/L (ref 22–29)
COLOR UR: COLORLESS
CREAT SERPL-MCNC: 0.72 MG/DL (ref 0.72–1.25)
CREAT UR-MCNC: 25 MG/DL (ref 23–375)
DIFFERENTIAL METHOD BLD: ABNORMAL
EGFR (NO RACE VARIABLE) (RUSH/TITUS): 118 ML/MIN/1.73M2
EOSINOPHIL # BLD AUTO: 0.17 K/UL (ref 0–0.5)
EOSINOPHIL NFR BLD AUTO: 2.5 % (ref 1–4)
ERYTHROCYTE [DISTWIDTH] IN BLOOD BY AUTOMATED COUNT: 13.7 % (ref 11.5–14.5)
EST. AVERAGE GLUCOSE BLD GHB EST-MCNC: 137 MG/DL
GLOBULIN SER-MCNC: 4.3 G/DL (ref 2–4)
GLUCOSE SERPL-MCNC: 98 MG/DL (ref 74–100)
GLUCOSE UR STRIP-MCNC: NORMAL MG/DL
HBA1C MFR BLD HPLC: 6.4 %
HCT VFR BLD AUTO: 52.3 % (ref 40–54)
HDLC SERPL-MCNC: 26 MG/DL (ref 35–60)
HGB BLD-MCNC: 16.9 G/DL (ref 13.5–18)
IMM GRANULOCYTES # BLD AUTO: 0.02 K/UL (ref 0–0.04)
IMM GRANULOCYTES NFR BLD: 0.3 % (ref 0–0.4)
KETONES UR STRIP-SCNC: NEGATIVE MG/DL
LDLC SERPL CALC-MCNC: 77 MG/DL
LDLC/HDLC SERPL: 3 {RATIO}
LEUKOCYTE ESTERASE UR QL STRIP: NEGATIVE
LYMPHOCYTES # BLD AUTO: 1.71 K/UL (ref 1–4.8)
LYMPHOCYTES NFR BLD AUTO: 25 % (ref 27–41)
MCH RBC QN AUTO: 26.7 PG (ref 27–31)
MCHC RBC AUTO-ENTMCNC: 32.3 G/DL (ref 32–36)
MCV RBC AUTO: 82.8 FL (ref 80–96)
MICROALBUMIN UR-MCNC: <0.5 MG/DL
MICROALBUMIN/CREAT RATIO PNL UR: NORMAL
MONOCYTES # BLD AUTO: 0.68 K/UL (ref 0–0.8)
MONOCYTES NFR BLD AUTO: 9.9 % (ref 2–6)
MPC BLD CALC-MCNC: 11.4 FL (ref 9.4–12.4)
NEUTROPHILS # BLD AUTO: 4.2 K/UL (ref 1.8–7.7)
NEUTROPHILS NFR BLD AUTO: 61.4 % (ref 53–65)
NITRITE UR QL STRIP: NEGATIVE
NONHDLC SERPL-MCNC: 90 MG/DL
NRBC # BLD AUTO: 0 X10E3/UL
NRBC, AUTO (.00): 0 %
PH UR STRIP: 6.5 PH UNITS
PLATELET # BLD AUTO: 270 K/UL (ref 150–400)
POTASSIUM SERPL-SCNC: 4.2 MMOL/L (ref 3.5–5.1)
PROT SERPL-MCNC: 8.7 G/DL (ref 6.4–8.3)
PROT UR QL STRIP: NEGATIVE
RBC # BLD AUTO: 6.32 M/UL (ref 4.6–6.2)
RBC # UR STRIP: NEGATIVE /UL
SODIUM SERPL-SCNC: 139 MMOL/L (ref 136–145)
SP GR UR STRIP: 1.01
TRIGL SERPL-MCNC: 63 MG/DL (ref 34–140)
TSH SERPL DL<=0.005 MIU/L-ACNC: 1.95 UIU/ML (ref 0.35–4.94)
UROBILINOGEN UR STRIP-ACNC: NORMAL MG/DL
VLDLC SERPL-MCNC: 13 MG/DL
WBC # BLD AUTO: 6.84 K/UL (ref 4.5–11)

## 2025-06-25 PROCEDURE — 82306 VITAMIN D 25 HYDROXY: CPT | Mod: ,,, | Performed by: CLINICAL MEDICAL LABORATORY

## 2025-06-25 PROCEDURE — 80061 LIPID PANEL: CPT | Mod: ,,, | Performed by: CLINICAL MEDICAL LABORATORY

## 2025-06-25 PROCEDURE — 80050 GENERAL HEALTH PANEL: CPT | Mod: ,,, | Performed by: CLINICAL MEDICAL LABORATORY

## 2025-06-25 PROCEDURE — 82043 UR ALBUMIN QUANTITATIVE: CPT | Mod: ,,, | Performed by: CLINICAL MEDICAL LABORATORY

## 2025-06-25 PROCEDURE — 83036 HEMOGLOBIN GLYCOSYLATED A1C: CPT | Mod: ,,, | Performed by: CLINICAL MEDICAL LABORATORY

## 2025-06-25 PROCEDURE — 81003 URINALYSIS AUTO W/O SCOPE: CPT | Mod: QW,,, | Performed by: CLINICAL MEDICAL LABORATORY

## 2025-06-25 PROCEDURE — 82570 ASSAY OF URINE CREATININE: CPT | Mod: ,,, | Performed by: CLINICAL MEDICAL LABORATORY

## 2025-07-02 ENCOUNTER — OFFICE VISIT (OUTPATIENT)
Dept: FAMILY MEDICINE | Facility: CLINIC | Age: 40
End: 2025-07-02
Payer: COMMERCIAL

## 2025-07-02 VITALS
BODY MASS INDEX: 39.67 KG/M2 | SYSTOLIC BLOOD PRESSURE: 120 MMHG | RESPIRATION RATE: 16 BRPM | HEIGHT: 72 IN | OXYGEN SATURATION: 97 % | TEMPERATURE: 98 F | DIASTOLIC BLOOD PRESSURE: 80 MMHG | HEART RATE: 65 BPM | WEIGHT: 292.88 LBS

## 2025-07-02 DIAGNOSIS — R20.2 NUMBNESS AND TINGLING IN RIGHT HAND: ICD-10-CM

## 2025-07-02 DIAGNOSIS — R20.0 NUMBNESS AND TINGLING IN RIGHT HAND: ICD-10-CM

## 2025-07-02 DIAGNOSIS — M54.2 PAIN IN NECK: ICD-10-CM

## 2025-07-02 DIAGNOSIS — Z00.00 ROUTINE GENERAL MEDICAL EXAMINATION AT A HEALTH CARE FACILITY: Primary | ICD-10-CM

## 2025-07-02 PROCEDURE — 3044F HG A1C LEVEL LT 7.0%: CPT | Mod: ,,, | Performed by: FAMILY MEDICINE

## 2025-07-02 PROCEDURE — 3008F BODY MASS INDEX DOCD: CPT | Mod: ,,, | Performed by: FAMILY MEDICINE

## 2025-07-02 PROCEDURE — 3079F DIAST BP 80-89 MM HG: CPT | Mod: ,,, | Performed by: FAMILY MEDICINE

## 2025-07-02 PROCEDURE — 1160F RVW MEDS BY RX/DR IN RCRD: CPT | Mod: ,,, | Performed by: FAMILY MEDICINE

## 2025-07-02 PROCEDURE — 1159F MED LIST DOCD IN RCRD: CPT | Mod: ,,, | Performed by: FAMILY MEDICINE

## 2025-07-02 PROCEDURE — 99396 PREV VISIT EST AGE 40-64: CPT | Mod: ,,, | Performed by: FAMILY MEDICINE

## 2025-07-02 PROCEDURE — 3074F SYST BP LT 130 MM HG: CPT | Mod: ,,, | Performed by: FAMILY MEDICINE

## 2025-07-02 RX ORDER — GABAPENTIN 300 MG/1
300 CAPSULE ORAL NIGHTLY
Qty: 90 CAPSULE | Refills: 3 | Status: SHIPPED | OUTPATIENT
Start: 2025-07-02 | End: 2026-07-02

## 2025-07-02 NOTE — PROGRESS NOTES
Subjective     Patient ID: Eliecer Collins II is a 40 y.o. male.    Chief Complaint: Healthy You (Healthy you)    41 yo WM here for HY#2. Goes to [facility] tomorrow for check up.       Review of Systems   Constitutional:  Negative for activity change.   Eyes:  Negative for visual disturbance.   Respiratory:  Negative for shortness of breath.    Cardiovascular:  Negative for chest pain.   Gastrointestinal:  Negative for abdominal pain.   Neurological:  Negative for headaches.   Psychiatric/Behavioral:  Negative for dysphoric mood.        Tobacco Use: Low Risk  (1/16/2025)    Patient History     Smoking Tobacco Use: Never     Smokeless Tobacco Use: Never     Passive Exposure: Never     Review of patient's allergies indicates:   Allergen Reactions    Pcn [penicillins] Anaphylaxis    Sulfa (sulfonamide antibiotics) Itching    Aminophyllin     Cefaclor     Flagyl [metronidazole] Diarrhea    Codeine Rash     Current Outpatient Medications   Medication Instructions    acetaminophen (TYLENOL) 500 mg, Every 6 hours PRN    cetirizine (ZYRTEC) 10 mg, Daily    cholecalciferol (vitamin D3) 5,000 Units, Daily    diltiaZEM (CARDIZEM CD) 240 mg, Oral, Daily    erythromycin (ROMYCIN) ophthalmic ointment SMARTSIG:Sparingly In Eye(s) Twice Daily    furosemide (LASIX) 40 mg, Oral, Daily    gabapentin (NEURONTIN) 300 mg, Oral, Nightly    gentamicin (GARAMYCIN) 0.3 % ophthalmic solution 1 drop, Both Eyes, 4 times daily    meclizine (ANTIVERT) 25 mg, Oral, 4 times daily PRN    metoprolol succinate (TOPROL-XL) 100 mg, Oral, Daily    montelukast (SINGULAIR) 10 mg, Oral, Nightly    potassium chloride SA (K-DUR,KLOR-CON) 20 MEQ tablet 20 mEq, Oral, Daily    rosuvastatin (CRESTOR) 10 mg, Oral, Daily    XARELTO 20 mg, Oral, Daily     Medications Discontinued During This Encounter   Medication Reason    gabapentin (NEURONTIN) 100 MG capsule Dose adjustment       Past Medical History:   Diagnosis Date    Atrial fibrillation     Encounter for long-term  (current) use of other medications     Hyperglycemia     Hyperlipidemia     Renal vein bleed     Vertigo     Vitamin D deficiency      Health Maintenance Topics with due status: Not Due       Topic Last Completion Date    TETANUS VACCINE 05/30/2019    Influenza Vaccine 11/06/2024    Hemoglobin A1c (Prediabetes) 06/25/2025    Lipid Panel 06/25/2025    RSV Vaccine (Age 60+ and Pregnant patients) Not Due     Immunization History   Administered Date(s) Administered    COVID-19 MRNA, LN-S PF (MODERNA HALF 0.25 ML DOSE) 10/29/2021    COVID-19, MRNA, LN-S, PF (MODERNA FULL 0.5 ML DOSE) 02/05/2021, 03/05/2021    COVID-19, mRNA, LNP-S, bivalent booster, PF (Moderna Omicron)12 + YEARS 01/27/2023    Hepatitis B, Adult 06/14/1998    Influenza - Quadrivalent 09/29/2020    Influenza - Quadrivalent - MDCK 10/01/2022    Influenza - Quadrivalent - PF *Preferred* (6 months and older) 10/01/2021, 11/19/2023    Influenza - Trivalent - Fluarix, Flulaval, Fluzone, Afluria - PF 10/17/2024    Meningococcal Polysaccharide (23-Valent) (MPSV4) 06/14/2004    Pneumococcal Conjugate - 20 Valent 11/19/2023    Pneumococcal Polysaccharide - 23 Valent 01/25/2013    Tdap 05/30/2019     No visits with results within 1 Week(s) from this visit.   Latest known visit with results is:   Clinical Support on 06/25/2025   Component Date Value Ref Range Status    Vitamin D 25-Hydroxy, Blood 06/25/2025 53.0  30.0 - 80.0 ng/mL Final    Vitamin D 25-OH Adult Reference Values:  Deficiency: <20 ng/mL  Insufficiency: 20 - <30 ng/mL  Sufficiency: 30 -100 ng/mL    Vitamin D 25-OH Pediatric Reference Values:  Deficiency: <15 ng/mL  Insufficiency: 15 - <20 ng/mL  Sufficiency: 20 - 100 ng/mL    TSH 06/25/2025 1.947  0.350 - 4.940 uIU/mL Final    Sodium 06/25/2025 139  136 - 145 mmol/L Final    Potassium 06/25/2025 4.2  3.5 - 5.1 mmol/L Final    Chloride 06/25/2025 101  98 - 107 mmol/L Final    CO2 06/25/2025 28  22 - 29 mmol/L Final    Anion Gap 06/25/2025 14  7 - 16  mmol/L Final    Glucose 06/25/2025 98  74 - 100 mg/dL Final    BUN 06/25/2025 9  9 - 21 mg/dL Final    Creatinine 06/25/2025 0.72  0.72 - 1.25 mg/dL Final    BUN/Creatinine Ratio 06/25/2025 13  6 - 20 Final    Calcium 06/25/2025 9.4  8.4 - 10.2 mg/dL Final    Total Protein 06/25/2025 8.7 (H)  6.4 - 8.3 g/dL Final    Albumin 06/25/2025 4.4  3.5 - 5.0 g/dL Final    Globulin 06/25/2025 4.3 (H)  2.0 - 4.0 g/dL Final    A/G Ratio 06/25/2025 1.0   Final    Bilirubin, Total 06/25/2025 2.7 (H)  <=1.5 mg/dL Final    Alk Phos 06/25/2025 80  40 - 150 U/L Final    ALT 06/25/2025 28  <=55 U/L Final    AST 06/25/2025 31  11 - 45 U/L Final    eGFR 06/25/2025 118  >=60 mL/min/1.73m2 Final    Estimated GFR calculated using the CKD-EPI creatinine (2021) equation.    WBC 06/25/2025 6.84  4.50 - 11.00 K/uL Final    RBC 06/25/2025 6.32 (H)  4.60 - 6.20 M/uL Final    Hemoglobin 06/25/2025 16.9  13.5 - 18.0 g/dL Final    Hematocrit 06/25/2025 52.3  40.0 - 54.0 % Final    MCV 06/25/2025 82.8  80.0 - 96.0 fL Final    MCH 06/25/2025 26.7 (L)  27.0 - 31.0 pg Final    MCHC 06/25/2025 32.3  32.0 - 36.0 g/dL Final    RDW 06/25/2025 13.7  11.5 - 14.5 % Final    Platelet Count 06/25/2025 270  150 - 400 K/uL Final    MPV 06/25/2025 11.4  9.4 - 12.4 fL Final    Neutrophils % 06/25/2025 61.4  53.0 - 65.0 % Final    Lymphocytes % 06/25/2025 25.0 (L)  27.0 - 41.0 % Final    Monocytes % 06/25/2025 9.9 (H)  2.0 - 6.0 % Final    Eosinophils % 06/25/2025 2.5  1.0 - 4.0 % Final    Basophils % 06/25/2025 0.9  0.0 - 1.0 % Final    Immature Granulocytes % 06/25/2025 0.3  0.0 - 0.4 % Final    nRBC, Auto 06/25/2025 0.0  <=0.0 % Final    Neutrophils, Abs 06/25/2025 4.20  1.80 - 7.70 K/uL Final    Lymphocytes, Absolute 06/25/2025 1.71  1.00 - 4.80 K/uL Final    Monocytes, Absolute 06/25/2025 0.68  0.00 - 0.80 K/uL Final    Eosinophils, Absolute 06/25/2025 0.17  0.00 - 0.50 K/uL Final    Basophils, Absolute 06/25/2025 0.06  0.00 - 0.20 K/uL Final    Immature  Granulocytes, Absolute 06/25/2025 0.02  0.00 - 0.04 K/uL Final    nRBC, Absolute 06/25/2025 0.00  <=0.00 x10e3/uL Final    Diff Type 06/25/2025 Auto   Final    Triglycerides 06/25/2025 63  34 - 140 mg/dL Final      Normal:  <150 mg/dL  Borderline High: 150-199 mg/dL  High:   200-499 mg/dL  Very High:  >=500    Cholesterol 06/25/2025 116  <=200 mg/dL Final      <200 mg/dL:  Desirable  200-240 mg/dL: Borderline High  >240 mg/dL:  High    HDL Cholesterol 06/25/2025 26 (L)  35 - 60 mg/dL Final      <40 mg/dL: Low HDL  40-60 mg/dL: Normal  >60 mg/dL: Desirable    Cholesterol/HDL Ratio (Risk Factor) 06/25/2025 4.5   Final    Non-HDL 06/25/2025 90  mg/dL Final    LDL Calculated 06/25/2025 77  mg/dL Final    Unable to calculate due to one of the following values:  Cholesterol <5  HDL Cholesterol <5  Triglycerides <10 or >400  LDL calculated using the Friedewald equation.    LDL/HDL 06/25/2025 3.0   Final    Unable to calculate due to one of the following values:  Cholesterol <5  HDL Cholesterol <5  Triglycerides <10 or >400    VLDL 06/25/2025 13  mg/dL Final    Hemoglobin A1C 06/25/2025 6.4  <=7.0 % Final      Normal:               <5.7%  Pre-Diabetic:       5.7% to 6.4%  Diabetic:             >6.4%  Diabetic Goal:     <7%    Estimated Average Glucose 06/25/2025 137  mg/dL Final    Color, UA 06/25/2025 Colorless  Colorless, Straw, Yellow, Dark Yellow, Light Yellow Final    Clarity, UA 06/25/2025 Clear  Clear Final    pH, UA 06/25/2025 6.5  5.0 to 8.0 pH Units Final    Leukocytes, UA 06/25/2025 Negative  Negative Final    Nitrites, UA 06/25/2025 Negative  Negative Final    Protein, UA 06/25/2025 Negative  Negative Final    Glucose, UA 06/25/2025 Normal  Normal mg/dL Final    Ketones, UA 06/25/2025 Negative  Negative mg/dL Final    Urobilinogen, UA 06/25/2025 Normal  0.2, 1.0, Normal mg/dL Final    Bilirubin, UA 06/25/2025 Negative  Negative Final    Blood, UA 06/25/2025 Negative  Negative Final    Specific [place], UA  "06/25/2025 1.006  <=1.030 Final    Creatinine, Urine 06/25/2025 25  23 - 375 mg/dL Final    Microalbumin 06/25/2025 <0.5  <=3.0 mg/dL Final    Microalbumin/Creatinine Ratio 06/25/2025    Final    Unable to calculate         Objective   Blood pressure 120/80, pulse 65, temperature 98.2 °F (36.8 °C), temperature source Oral, resp. rate 16, height 6' (1.829 m), weight 132.9 kg (292 lb 14.4 oz), SpO2 97%.    Body mass index is 39.72 kg/m².  Wt Readings from Last 3 Encounters:   07/02/25 132.9 kg (292 lb 14.4 oz)   01/16/25 135 kg (297 lb 11.2 oz)   10/15/24 132.9 kg (293 lb)     Ht Readings from Last 3 Encounters:   07/02/25 6' (1.829 m)   01/16/25 6' (1.829 m)   10/15/24 6' 0.01" (1.829 m)     BP Readings from Last 3 Encounters:   07/02/25 120/80   01/16/25 128/78   10/15/24 116/74     Temp Readings from Last 3 Encounters:   07/02/25 98.2 °F (36.8 °C) (Oral)   01/16/25 98.1 °F (36.7 °C) (Oral)   10/15/24 97.8 °F (36.6 °C)     Pulse Readings from Last 3 Encounters:   07/02/25 65   01/16/25 80   07/18/24 77     Resp Readings from Last 3 Encounters:   07/02/25 16   01/16/25 16   10/15/24 20     PF Readings from Last 3 Encounters:   No data found for PF       Physical Exam  Constitutional:       Appearance: Normal appearance.   HENT:      Head: Normocephalic and atraumatic.      Nose: Nose normal.      Mouth/Throat:      Mouth: Mucous membranes are moist.   Eyes:      Pupils: Pupils are equal, round, and reactive to light.   Cardiovascular:      Rate and Rhythm: Normal rate and regular rhythm.      Pulses: Normal pulses.      Heart sounds: Normal heart sounds.   Pulmonary:      Effort: Pulmonary effort is normal.      Breath sounds: Normal breath sounds.   Abdominal:      General: Abdomen is flat.      Palpations: Abdomen is soft.   Skin:     General: Skin is warm and dry.   Neurological:      General: No focal deficit present.      Mental Status: He is alert and oriented to person, place, and time.   Psychiatric:         " Mood and Affect: Mood normal.         Behavior: Behavior normal.         Thought Content: Thought content normal.         Judgment: Judgment normal.         Assessment and Plan     Problem List Items Addressed This Visit    None  Visit Diagnoses         Routine general medical examination at a health care facility    -  Primary      Numbness and tingling in right hand        Relevant Medications    gabapentin (NEURONTIN) 300 MG capsule      Pain in neck        Relevant Medications    gabapentin (NEURONTIN) 300 MG capsule            Plan: RTC January for HY.       I have reviewed the medications, allergies, and problem list.     Goal Actions:    What type of visit is the patient here for today?: Healthy You  Does the patient consent to enroll in [facility] Healthy?: Yes  Is this a Wellness Follow Up?: Yes  What is your overall wellness goal? (select at least one): Lose weight  Choose 3: Biometric, Nutrition, Exercise  Biometric Actions: Attend regularly scheduled office visits  Nurtrition Actions: Eat a well-balanced diet  Exercise Actions: Recommend physical activity 30 minutes per day 3-5 times/week

## 2025-07-03 ENCOUNTER — PATIENT OUTREACH (OUTPATIENT)
Facility: HOSPITAL | Age: 40
End: 2025-07-03
Payer: COMMERCIAL

## 2025-07-03 NOTE — PROGRESS NOTES
Population Health Chart Review & Patient Outreach Details      Further Action Needed If Patient Returns Outreach:        Health Maintenance Due   Topic Date Due    Hepatitis C Screening  Never done    HIV Screening  Never done    COVID-19 Vaccine ( season) 2024          Updates Requested / Reviewed:     []  Care Everywhere    []     []  External Sources ([facility], [facility], [facility], etc.)    [] [facility]   [] [facility]   [] Other:    []  Care Team Updated   []  Removed  or Duplicate Orders   []  Immunization Reconciliation Completed / Queried    [] [place]   [] [place]   [] [place]   [] [place]      Health Maintenance Topics Addressed and Outreach Outcomes / Actions Taken:             Breast Cancer Screening []  Mammogram Order Placed    []  Mammogram Screening Scheduled    []  External Records Requested & Care Team Updated if Applicable    []  External Records Uploaded & Care Team Updated if Applicable    []  Pt Declined Scheduling Mammogram    []  Pt Will Schedule with External Provider / Order Routed & Care Team Updated if Applicable              Cervical Cancer Screening []  Pap Smear Scheduled in Primary Care or OBGYN    []  External Records Requested & Care Team Updated if Applicable       []  External Records Uploaded, Care Team Updated, & History Updated if Applicable    []  Patient Declined Scheduling Pap Smear    []  Patient Will Schedule with External Provider & Care Team Updated if Applicable                  Colorectal Cancer Screening []  Colonoscopy Case Request / Referral / Home Test Order Placed    []  External Records Requested & Care Team Updated if Applicable    []  External Records Uploaded, Care Team Updated, & History Updated if Applicable    []  Patient Declined Completing Colon Cancer Screening    []  Patient Will Schedule with External Provider & Care Team Updated if Applicable    []  Fit Kit Mailed (add the SmartPhrase under additional notes)    []  Reminded  Patient to Complete Home Test                Diabetic Eye Exam []  Eye Exam Screening Order Placed    []  Eye Camera Scheduled or Optometry/Ophthalmology Referral Placed    []  External Records Requested & Care Team Updated if Applicable    []  External Records Uploaded, Care Team Updated, & History Updated if Applicable    []  Patient Declined Scheduling Eye Exam    []  Patient Will Schedule with External Provider & Care Team Updated if Applicable             Blood Pressure Control []  Primary Care Follow Up Visit Scheduled     []  Remote Blood Pressure Reading Captured    []  Patient Declined Remote Reading or Scheduling Appt - Escalated to PCP    []  Patient Will Call Back or Send Portal Message with Reading                 HbA1c & Other Labs []  Overdue Lab(s) Ordered    []  Overdue Lab(s) Scheduled    []  External Records Uploaded & Care Team Updated if Applicable    []  Primary Care Follow Up Visit Scheduled     []  Reminded Patient to Complete A1c Home Test    []  Patient Declined Scheduling Labs or Will Call Back to Schedule    []  Patient Will Schedule with External Provider / Order Routed, & Care Team Updated if Applicable           Primary Care Appointment []  Primary Care Appt Scheduled    []  Patient Declined Scheduling or Will Call Back to Schedule    []  Pt Established with External Provider, Updated Care Team, & Informed Pt to Notify Payor if Applicable           Medication Adherence /    Statin Use []  Primary Care Appointment Scheduled    []  Patient Reminded to  Prescription    []  Patient Declined, Provider Notified if Needed    []  Sent Provider Message to Review to Evaluate Pt for Statin, Add Exclusion Dx Codes, Document   Exclusion in Problem List, Change Statin Intensity Level to Moderate or High Intensity if Applicable                Osteoporosis Screening []  Dexa Order Placed    []  Dexa Appointment Scheduled    []  External Records Requested & Care Team Updated    []  External  Records Uploaded, Care Team Updated, & History Updated if Applicable    []  Patient Declined Scheduling Dexa or Will Call Back to Schedule    []  Patient Will Schedule with External Provider / Order Routed & Care Team Updated if Applicable       Additional Notes:       Post visit Population Health review of encounter with date of service  7/2/25 with .  Not all required HY components in encounter.  Chart needs smoking hx updated message sent.  Followup appt for: 1/16/26 HY  HY 7/2/25 needs smoke hx updated  Received: Today  Nisreen Barroso LPN [facility] Staff  Please ask nurse or MA update smoking hx under social hx for  7/2/25 HY compliance. ThanksNisreen

## 2025-08-30 DIAGNOSIS — E78.5 HYPERLIPIDEMIA, UNSPECIFIED HYPERLIPIDEMIA TYPE: ICD-10-CM

## 2025-09-02 RX ORDER — ATORVASTATIN CALCIUM 20 MG/1
20 TABLET, FILM COATED ORAL
Qty: 90 TABLET | Refills: 3 | Status: SHIPPED | OUTPATIENT
Start: 2025-09-02